# Patient Record
Sex: FEMALE | Race: WHITE | NOT HISPANIC OR LATINO | Employment: OTHER | URBAN - NONMETROPOLITAN AREA
[De-identification: names, ages, dates, MRNs, and addresses within clinical notes are randomized per-mention and may not be internally consistent; named-entity substitution may affect disease eponyms.]

---

## 2017-05-23 DIAGNOSIS — H81.10 BPPV (BENIGN PAROXYSMAL POSITIONAL VERTIGO), UNSPECIFIED LATERALITY: ICD-10-CM

## 2017-05-23 RX ORDER — MECLIZINE HYDROCHLORIDE 25 MG/1
TABLET ORAL
Qty: 15 TABLET | Refills: 0 | Status: SHIPPED | OUTPATIENT
Start: 2017-05-23 | End: 2018-05-02 | Stop reason: ALTCHOICE

## 2017-06-01 ENCOUNTER — APPOINTMENT (OUTPATIENT)
Dept: LAB | Facility: HOSPITAL | Age: 72
End: 2017-06-01

## 2017-06-01 ENCOUNTER — OFFICE VISIT (OUTPATIENT)
Dept: FAMILY MEDICINE CLINIC | Facility: CLINIC | Age: 72
End: 2017-06-01

## 2017-06-01 VITALS
WEIGHT: 166 LBS | SYSTOLIC BLOOD PRESSURE: 120 MMHG | BODY MASS INDEX: 27.66 KG/M2 | DIASTOLIC BLOOD PRESSURE: 80 MMHG | HEIGHT: 65 IN

## 2017-06-01 DIAGNOSIS — H69.83 EUSTACHIAN TUBE DYSFUNCTION, BILATERAL: ICD-10-CM

## 2017-06-01 DIAGNOSIS — Z23 NEED FOR VACCINATION: Primary | ICD-10-CM

## 2017-06-01 DIAGNOSIS — Z12.31 VISIT FOR SCREENING MAMMOGRAM: ICD-10-CM

## 2017-06-01 DIAGNOSIS — J30.9 ALLERGIC RHINITIS, UNSPECIFIED ALLERGIC RHINITIS TRIGGER, UNSPECIFIED RHINITIS SEASONALITY: Chronic | ICD-10-CM

## 2017-06-01 DIAGNOSIS — E78.5 HYPERLIPIDEMIA, UNSPECIFIED HYPERLIPIDEMIA TYPE: Chronic | ICD-10-CM

## 2017-06-01 DIAGNOSIS — I10 ESSENTIAL HYPERTENSION: Chronic | ICD-10-CM

## 2017-06-01 LAB
ALBUMIN SERPL-MCNC: 4 G/DL (ref 3.4–4.8)
ALBUMIN/GLOB SERPL: 1.2 G/DL (ref 1.1–1.8)
ALP SERPL-CCNC: 113 U/L (ref 38–126)
ALT SERPL W P-5'-P-CCNC: 24 U/L (ref 9–52)
ANION GAP SERPL CALCULATED.3IONS-SCNC: 7 MMOL/L (ref 5–15)
ARTICHOKE IGE QN: 112 MG/DL (ref 1–129)
AST SERPL-CCNC: 37 U/L (ref 14–36)
BILIRUB SERPL-MCNC: 0.4 MG/DL (ref 0.2–1.3)
BUN BLD-MCNC: 12 MG/DL (ref 7–21)
BUN/CREAT SERPL: 11.4 (ref 7–25)
CALCIUM SPEC-SCNC: 8.9 MG/DL (ref 8.4–10.2)
CHLORIDE SERPL-SCNC: 99 MMOL/L (ref 95–110)
CO2 SERPL-SCNC: 29 MMOL/L (ref 22–31)
CREAT BLD-MCNC: 1.05 MG/DL (ref 0.5–1)
GFR SERPL CREATININE-BSD FRML MDRD: 52 ML/MIN/1.73 (ref 39–90)
GLOBULIN UR ELPH-MCNC: 3.3 GM/DL (ref 2.3–3.5)
GLUCOSE BLD-MCNC: 91 MG/DL (ref 60–100)
MAGNESIUM SERPL-MCNC: 2.4 MG/DL (ref 1.6–2.3)
POTASSIUM BLD-SCNC: 4.7 MMOL/L (ref 3.5–5.1)
PROT SERPL-MCNC: 7.3 G/DL (ref 6.3–8.6)
SODIUM BLD-SCNC: 135 MMOL/L (ref 137–145)

## 2017-06-01 PROCEDURE — 83721 ASSAY OF BLOOD LIPOPROTEIN: CPT | Performed by: FAMILY MEDICINE

## 2017-06-01 PROCEDURE — 36415 COLL VENOUS BLD VENIPUNCTURE: CPT | Performed by: FAMILY MEDICINE

## 2017-06-01 PROCEDURE — 99214 OFFICE O/P EST MOD 30 MIN: CPT | Performed by: FAMILY MEDICINE

## 2017-06-01 PROCEDURE — 83735 ASSAY OF MAGNESIUM: CPT | Performed by: FAMILY MEDICINE

## 2017-06-01 PROCEDURE — 90670 PCV13 VACCINE IM: CPT | Performed by: FAMILY MEDICINE

## 2017-06-01 PROCEDURE — G0009 ADMIN PNEUMOCOCCAL VACCINE: HCPCS | Performed by: FAMILY MEDICINE

## 2017-06-01 PROCEDURE — 80053 COMPREHEN METABOLIC PANEL: CPT | Performed by: FAMILY MEDICINE

## 2017-06-01 RX ORDER — OXYBUTYNIN CHLORIDE 10 MG/1
10 TABLET, EXTENDED RELEASE ORAL NIGHTLY
COMMUNITY
Start: 2017-05-30 | End: 2018-12-06

## 2017-06-01 RX ORDER — FLUTICASONE PROPIONATE 50 MCG
2 SPRAY, SUSPENSION (ML) NASAL DAILY
Qty: 1 EACH | Refills: 11 | Status: SHIPPED | OUTPATIENT
Start: 2017-06-01 | End: 2017-07-01

## 2017-06-01 RX ORDER — HYOSCYAMINE SULFATE 0.125 MG
125 TABLET,DISINTEGRATING ORAL EVERY 4 HOURS PRN
COMMUNITY
End: 2018-10-07

## 2017-06-01 NOTE — PROGRESS NOTES
Subjective   Olivia Araujo is a 71 y.o. female.     History of Present Illness requesting primary care evaluation.  Carries diagnoses of hypertension hyperlipidemia however is not taking any medicine for same.  Is complaining of bilateral ear pain off and on pressure sensations.  Also needs a mammogram scheduled.  Gets colonoscopies per Dr. Laguna.  Has had one pneumonia vaccine but not both.  Gets flu vaccines in the fall.  History noted.    The following portions of the patient's history were reviewed and updated as appropriate: allergies, current medications, past family history, past medical history, past social history, past surgical history and problem list.    Review of Systems   Constitutional: Negative for activity change, appetite change, fatigue and unexpected weight change.   HENT: Positive for ear pain. Negative for trouble swallowing and voice change.    Eyes: Negative for redness and visual disturbance.   Respiratory: Negative for cough and wheezing.    Cardiovascular: Negative for chest pain and palpitations.   Gastrointestinal: Negative for abdominal pain, constipation, diarrhea, nausea and vomiting.   Genitourinary: Negative for urgency.   Musculoskeletal: Negative for joint swelling.   Neurological: Negative for syncope and headaches.   Hematological: Negative for adenopathy.   Psychiatric/Behavioral: Negative for sleep disturbance.       Objective   Physical Exam   Constitutional: She is oriented to person, place, and time. She appears well-developed.   HENT:   Head: Normocephalic.   Right Ear: External ear normal. Tympanic membrane is retracted.   Left Ear: Tympanic membrane is retracted.   Nose: Nose normal.   Mouth/Throat: Oropharynx is clear and moist.   Eyes: Pupils are equal, round, and reactive to light.   Neck: Normal range of motion. No thyromegaly present.   Cardiovascular: Normal rate, regular rhythm, normal heart sounds and intact distal pulses.  Exam reveals no gallop and no  friction rub.    No murmur heard.  Pulmonary/Chest: Breath sounds normal.   Abdominal: Soft. She exhibits no distension and no mass. There is no tenderness.   Musculoskeletal: Normal range of motion.   Neurological: She is alert and oriented to person, place, and time. She has normal reflexes.   Skin: Skin is warm and dry.   Psychiatric: She has a normal mood and affect.       Assessment/Plan   Problems Addressed this Visit        Cardiovascular and Mediastinum    Hyperlipidemia (Chronic)    Relevant Orders    LDL cholesterol, direct    Essential hypertension (Chronic)    Relevant Orders    Comprehensive Metabolic Panel    Magnesium       Respiratory    Allergic rhinitis (Chronic)      Other Visit Diagnoses     Need for vaccination    -  Primary    Relevant Orders    Pneumococcal Conjugate Vaccine 13-Valent All (Completed)    Eustachian tube dysfunction, bilateral        Relevant Medications    fluticasone (FLONASE) 50 MCG/ACT nasal spray    Visit for screening mammogram        Relevant Orders    Mammo Screening Digital Tomosynthesis Bilateral With CAD         on summer time hydration counseled on lifestyle measures fall prevention etc.  Screenings and labs ordered.  Counseled on same.  Recheck 6 months.

## 2017-07-10 ENCOUNTER — APPOINTMENT (OUTPATIENT)
Dept: MAMMOGRAPHY | Facility: CLINIC | Age: 72
End: 2017-07-10

## 2017-07-10 DIAGNOSIS — Z12.31 VISIT FOR SCREENING MAMMOGRAM: ICD-10-CM

## 2017-07-10 PROCEDURE — 77063 BREAST TOMOSYNTHESIS BI: CPT | Performed by: FAMILY MEDICINE

## 2017-07-10 PROCEDURE — G0202 SCR MAMMO BI INCL CAD: HCPCS | Performed by: FAMILY MEDICINE

## 2017-07-13 RX ORDER — CYCLOSPORINE 0.5 MG/ML
EMULSION OPHTHALMIC
Qty: 1 EACH | Refills: 5 | Status: SHIPPED | OUTPATIENT
Start: 2017-07-13 | End: 2018-05-02 | Stop reason: ALTCHOICE

## 2017-10-31 ENCOUNTER — OFFICE VISIT (OUTPATIENT)
Dept: FAMILY MEDICINE CLINIC | Facility: CLINIC | Age: 72
End: 2017-10-31

## 2017-10-31 VITALS
SYSTOLIC BLOOD PRESSURE: 122 MMHG | HEIGHT: 65 IN | DIASTOLIC BLOOD PRESSURE: 78 MMHG | BODY MASS INDEX: 26.73 KG/M2 | WEIGHT: 160.4 LBS

## 2017-10-31 DIAGNOSIS — S21.201A WOUND OF RIGHT SIDE OF BACK, INITIAL ENCOUNTER: Primary | ICD-10-CM

## 2017-10-31 DIAGNOSIS — R21 RASH: ICD-10-CM

## 2017-10-31 PROCEDURE — 90471 IMMUNIZATION ADMIN: CPT | Performed by: FAMILY MEDICINE

## 2017-10-31 PROCEDURE — 99213 OFFICE O/P EST LOW 20 MIN: CPT | Performed by: FAMILY MEDICINE

## 2017-10-31 PROCEDURE — 90715 TDAP VACCINE 7 YRS/> IM: CPT | Performed by: FAMILY MEDICINE

## 2017-10-31 RX ORDER — CLOBETASOL PROPIONATE 0.5 MG/G
CREAM TOPICAL 2 TIMES DAILY
Qty: 30 G | Refills: 1 | Status: SHIPPED | OUTPATIENT
Start: 2017-10-31 | End: 2018-05-02 | Stop reason: ALTCHOICE

## 2017-10-31 NOTE — PROGRESS NOTES
Subjective   Olivia Araujo is a 72 y.o. female.     History of Present Illness  requesting evaluation wound and rash right mid axillary line posterior axillary line for about the past 2-3 weeks.  Patient states that she thought it was a tick may have had it partially removed.  Then been putting over-the-counter medicines on in the rashes continued.  Slightly pruritic.  No fevers no chills no drainage.    The following portions of the patient's history were reviewed and updated as appropriate: allergies, current medications, past family history, past medical history, past social history, past surgical history and problem list.    Review of Systems   Constitutional: Negative for activity change, appetite change, fatigue and unexpected weight change.   HENT: Negative for trouble swallowing and voice change.    Eyes: Negative for redness and visual disturbance.   Respiratory: Negative for cough and wheezing.    Cardiovascular: Negative for chest pain and palpitations.   Gastrointestinal: Negative for abdominal pain, constipation, diarrhea, nausea and vomiting.   Genitourinary: Negative for urgency.   Musculoskeletal: Negative for joint swelling.   Skin: Positive for rash and wound.   Neurological: Negative for syncope and headaches.   Hematological: Negative for adenopathy.   Psychiatric/Behavioral: Negative for sleep disturbance.       Objective   Physical Exam   Constitutional: She appears well-developed.   HENT:   Head: Normocephalic.   Eyes: Pupils are equal, round, and reactive to light.   Neck: Normal range of motion.   Cardiovascular: Normal rate and regular rhythm.    Abdominal: Soft.   Skin:        Above-mentioned area shows a punctate wound with small eschar about 4 mm with a small area of localized reaction about 3 mm round flat around that shows small raised red areas consistent with neomycin rash about 3 cm.   Psychiatric: She has a normal mood and affect. Her speech is normal.   Mild hard of hearing        Assessment/Plan   Olivia was seen today for insect bite.    Diagnoses and all orders for this visit:    Wound of right side of back, initial encounter  -     Tdap Vaccine Greater Than or Equal To 6yo IM    Rash  -     clobetasol (TEMOVATE) 0.05 % cream; Apply  topically 2 (Two) Times a Day. X 10d to area          on no manipulation.  Stop over-the-counter medicines.  Routine bathing.  Short-term medication ordered.   disease process.  Update tetanus.  Recheck as directed.

## 2017-12-01 ENCOUNTER — OFFICE VISIT (OUTPATIENT)
Dept: FAMILY MEDICINE CLINIC | Facility: CLINIC | Age: 72
End: 2017-12-01

## 2017-12-01 VITALS
HEIGHT: 65 IN | WEIGHT: 170 LBS | SYSTOLIC BLOOD PRESSURE: 136 MMHG | DIASTOLIC BLOOD PRESSURE: 82 MMHG | BODY MASS INDEX: 28.32 KG/M2

## 2017-12-01 DIAGNOSIS — K21.9 GASTROESOPHAGEAL REFLUX DISEASE WITHOUT ESOPHAGITIS: Chronic | ICD-10-CM

## 2017-12-01 DIAGNOSIS — I10 ESSENTIAL HYPERTENSION: Primary | Chronic | ICD-10-CM

## 2017-12-01 DIAGNOSIS — L57.0 ACTINIC KERATOSIS: ICD-10-CM

## 2017-12-01 DIAGNOSIS — L81.4 LENTIGO: ICD-10-CM

## 2017-12-01 DIAGNOSIS — F41.1 GAD (GENERALIZED ANXIETY DISORDER): ICD-10-CM

## 2017-12-01 PROCEDURE — 99214 OFFICE O/P EST MOD 30 MIN: CPT | Performed by: FAMILY MEDICINE

## 2017-12-01 PROCEDURE — 17000 DESTRUCT PREMALG LESION: CPT | Performed by: FAMILY MEDICINE

## 2017-12-01 NOTE — PROGRESS NOTES
Subjective   Olivia Araujo is a 72 y.o. female.     History of Present Illness   reevaluation diet-controlled hypertension.  History of hyperlipidemia but normal cholesterol last time.  Also history of dry eye syndrome.  Is up-to-date on all immunizations.  Multiple skin and eye issues.  Enforces some element of HAL as well.  Does have an early actinic keratoses at the dorsum right hand base MIP joint index does need to be frozen.  Has several lentigo of the FACE COUNSELED ABOUT.  ALSO COMPLAINING OF DRY FOR BACK TO OPHTHALMOLOGY FOR SAME.  The following portions of the patient's history were reviewed and updated as appropriate: allergies, current medications, past family history, past medical history, past social history, past surgical history and problem list.    Review of Systems   Constitutional: Negative for activity change, appetite change, fatigue and unexpected weight change.   HENT: Negative for trouble swallowing and voice change.    Eyes: Negative for redness and visual disturbance.   Respiratory: Negative for cough and wheezing.    Cardiovascular: Negative for chest pain and palpitations.   Gastrointestinal: Negative for abdominal pain, constipation, diarrhea, nausea and vomiting.   Genitourinary: Negative for urgency.   Musculoskeletal: Negative for joint swelling.   Neurological: Negative for syncope and headaches.   Hematological: Negative for adenopathy.   Psychiatric/Behavioral: Negative for sleep disturbance.       Objective   Physical Exam   Constitutional: She is oriented to person, place, and time. She appears well-developed.   HENT:   Head: Normocephalic.   Nose: Nose normal.   Eyes: Pupils are equal, round, and reactive to light.   Neck: Normal range of motion. No thyromegaly present.   Cardiovascular: Normal rate, regular rhythm, normal heart sounds and intact distal pulses.  Exam reveals no gallop and no friction rub.    No murmur heard.  Pulmonary/Chest: Breath sounds normal.    Abdominal: Soft. She exhibits no distension and no mass. There is no tenderness.   Musculoskeletal: Normal range of motion.   Neurological: She is alert and oriented to person, place, and time. She has normal reflexes.   Skin: Skin is warm and dry.   Above-mentioned skin changes as noticed.  No other new changes other than right hand.   Psychiatric: Her behavior is normal. Her mood appears anxious. Her speech is tangential.       Assessment/Plan   Problems Addressed this Visit        Cardiovascular and Mediastinum    Essential hypertension - Primary (Chronic)       Digestive    GERD (gastroesophageal reflux disease) (Chronic)       Other    HAL (generalized anxiety disorder)      Other Visit Diagnoses     Lentigo        Actinic keratosis            Further actinic keratosis frozen with liquid nitrogen ×2 after informed consent.  Counseled on skincare wound care no manipulation etc.  Counseled on skin care for the other counseled eye care.  Active listening.   on fall prevention infection prevention etc.  Recheck 6 months betime for lab.

## 2018-01-11 ENCOUNTER — OFFICE VISIT (OUTPATIENT)
Dept: FAMILY MEDICINE CLINIC | Facility: CLINIC | Age: 73
End: 2018-01-11

## 2018-01-11 VITALS
SYSTOLIC BLOOD PRESSURE: 120 MMHG | HEIGHT: 65 IN | BODY MASS INDEX: 26.67 KG/M2 | DIASTOLIC BLOOD PRESSURE: 76 MMHG | WEIGHT: 160.1 LBS

## 2018-01-11 DIAGNOSIS — F41.1 GAD (GENERALIZED ANXIETY DISORDER): ICD-10-CM

## 2018-01-11 DIAGNOSIS — H57.12 EYE PAIN, LEFT: ICD-10-CM

## 2018-01-11 DIAGNOSIS — H92.02 LEFT EAR PAIN: Primary | ICD-10-CM

## 2018-01-11 PROCEDURE — 99214 OFFICE O/P EST MOD 30 MIN: CPT | Performed by: FAMILY MEDICINE

## 2018-01-11 RX ORDER — CLOBETASOL PROPIONATE 0.46 MG/ML
SOLUTION TOPICAL
Qty: 25 ML | Refills: 1 | Status: SHIPPED | OUTPATIENT
Start: 2018-01-11 | End: 2020-02-12

## 2018-01-11 RX ORDER — FLUTICASONE PROPIONATE 50 MCG
SPRAY, SUSPENSION (ML) NASAL
COMMUNITY
Start: 2017-11-10 | End: 2018-05-07 | Stop reason: SDUPTHER

## 2018-01-11 RX ORDER — HYOSCYAMINE SULFATE 0.125 MG
TABLET ORAL
COMMUNITY
Start: 2017-11-14 | End: 2018-05-02 | Stop reason: SDUPTHER

## 2018-01-11 NOTE — PROGRESS NOTES
"Subjective   Olivia Araujo is a 72 y.o. female.     History of Present Illness   requesting evaluation of recurrent ear pain left.  States his had pain since childhood.  Unfortunately somewhat tangential and convoluted history.  States had ear pain off and on for sometimes.  Puts weight on it today and it made her left eye \"hurting respiratory for some time as well.  Have counseled on pathophysiology and anatomy of the head and neck.  Does see ophthalmology for above-mentioned R-wave problems and referred back to same for same.  Organic overlay.    The following portions of the patient's history were reviewed and updated as appropriate: allergies, current medications, past family history, past medical history, past social history, past surgical history and problem list.    Review of Systems   Constitutional: Negative for activity change, appetite change, fatigue and unexpected weight change.   HENT: Positive for ear pain. Negative for trouble swallowing and voice change.    Eyes: Positive for pain. Negative for redness and visual disturbance.   Respiratory: Negative for cough and wheezing.    Cardiovascular: Negative for chest pain and palpitations.   Gastrointestinal: Negative for abdominal pain, constipation, diarrhea, nausea and vomiting.   Genitourinary: Negative for urgency.   Musculoskeletal: Negative for joint swelling.   Neurological: Negative for syncope and headaches.   Hematological: Negative for adenopathy.   Psychiatric/Behavioral: Negative for sleep disturbance.       Objective   Physical Exam   Constitutional: She appears well-developed.   HENT:   Head: Normocephalic.   Right Ear: External ear normal.   Nose: Nose normal.   Mouth/Throat: Oropharynx is clear and moist.   Left canal shows minimal wax buildup unfortunately skin oiliness was difficult to get a good view the drum is retracted with no scar.  There may be some slight flaking of skin perhaps mild eczema but no pathology is seen.  TMJ " nontender.   Eyes: Conjunctivae and EOM are normal. Pupils are equal, round, and reactive to light.   Cardiovascular: Regular rhythm.    Pulmonary/Chest: Effort normal.   Psychiatric: Her behavior is normal. Her mood appears anxious. Her speech is tangential.       Assessment/Plan   Problems Addressed this Visit        Other    HAL (generalized anxiety disorder)      Other Visit Diagnoses     Left ear pain    -  Primary    Relevant Medications    clobetasol (TEMOVATE) 0.05 % external solution    carbamide peroxide (DEBROX) 6.5 % otic solution    Eye pain, left             her lack of findings.  Symptomatic relief above-mentioned medications.  Counseled no manipulation of the ear.  Active listening.  Deferred back to ophthalmology as directed.  Recheck as directed.

## 2018-05-02 RX ORDER — RABEPRAZOLE SODIUM 20 MG/1
20 TABLET, DELAYED RELEASE ORAL DAILY
COMMUNITY
End: 2019-08-26 | Stop reason: SDUPTHER

## 2018-05-02 RX ORDER — LANOLIN ALCOHOL/MO/W.PET/CERES
1000 CREAM (GRAM) TOPICAL DAILY
COMMUNITY
End: 2020-09-29

## 2018-05-02 RX ORDER — FAMOTIDINE 40 MG/1
40 TABLET, FILM COATED ORAL NIGHTLY PRN
COMMUNITY
End: 2018-06-08

## 2018-05-07 ENCOUNTER — OFFICE VISIT (OUTPATIENT)
Dept: FAMILY MEDICINE CLINIC | Facility: CLINIC | Age: 73
End: 2018-05-07

## 2018-05-07 VITALS
SYSTOLIC BLOOD PRESSURE: 138 MMHG | BODY MASS INDEX: 26.33 KG/M2 | HEIGHT: 65 IN | DIASTOLIC BLOOD PRESSURE: 80 MMHG | WEIGHT: 158 LBS

## 2018-05-07 DIAGNOSIS — F41.1 GAD (GENERALIZED ANXIETY DISORDER): Chronic | ICD-10-CM

## 2018-05-07 DIAGNOSIS — J30.9 ACUTE ALLERGIC RHINITIS, UNSPECIFIED SEASONALITY, UNSPECIFIED TRIGGER: Chronic | ICD-10-CM

## 2018-05-07 DIAGNOSIS — I10 ESSENTIAL HYPERTENSION: Primary | Chronic | ICD-10-CM

## 2018-05-07 PROCEDURE — 99214 OFFICE O/P EST MOD 30 MIN: CPT | Performed by: FAMILY MEDICINE

## 2018-05-07 RX ORDER — TRIAMTERENE AND HYDROCHLOROTHIAZIDE 37.5; 25 MG/1; MG/1
TABLET ORAL
Qty: 45 TABLET | Refills: 3 | Status: SHIPPED | OUTPATIENT
Start: 2018-05-07 | End: 2019-05-01 | Stop reason: SDUPTHER

## 2018-05-07 RX ORDER — FLUTICASONE PROPIONATE 50 MCG
2 SPRAY, SUSPENSION (ML) NASAL DAILY
Qty: 1 BOTTLE | Refills: 11 | Status: SHIPPED | OUTPATIENT
Start: 2018-05-07 | End: 2019-05-03 | Stop reason: SDUPTHER

## 2018-05-07 RX ORDER — HYDROCHLOROTHIAZIDE 12.5 MG/1
12.5 CAPSULE, GELATIN COATED ORAL DAILY
COMMUNITY
End: 2018-05-07

## 2018-05-07 NOTE — PROGRESS NOTES
Subjective   Olivia Araujo is a 72 y.o. female.     History of Present Illness   present evaluation hypertension.  Was taking blood pressure normal the past several weeks without murmurs been averaging about 150-160-145.  Bottom number below 80.  Pulse in the 70s.  His been on 100 with thiazide in the past.  Found all bottled started taking it.  Counseled stopping same.  Suspect does have an element of systolic hypertension mild.  Also requesting refill on Flonase.  Some amount of HAL as well.    The following portions of the patient's history were reviewed and updated as appropriate: allergies, current medications, past family history, past medical history, past social history, past surgical history and problem list.    Review of Systems   Constitutional: Negative for activity change, appetite change, fatigue and unexpected weight change.   HENT: Negative for trouble swallowing and voice change.    Eyes: Negative for redness and visual disturbance.   Respiratory: Negative for cough and wheezing.    Cardiovascular: Negative for chest pain and palpitations.   Gastrointestinal: Negative for abdominal pain, constipation, diarrhea, nausea and vomiting.   Genitourinary: Negative for urgency.   Musculoskeletal: Negative for joint swelling.   Neurological: Positive for dizziness. Negative for syncope and headaches.   Hematological: Negative for adenopathy.   Psychiatric/Behavioral: Negative for sleep disturbance.       Objective   Physical Exam   Constitutional: She is oriented to person, place, and time. She appears well-developed.   HENT:   Head: Normocephalic.   Nose: Nose normal.   Eyes: Pupils are equal, round, and reactive to light.   Neck: Normal range of motion. No thyromegaly present.   Cardiovascular: Normal rate, regular rhythm, normal heart sounds and intact distal pulses.  Exam reveals no gallop and no friction rub.    No murmur heard.  Pulmonary/Chest: Breath sounds normal.   Abdominal: Soft. She exhibits  no distension and no mass. There is no tenderness.   Musculoskeletal: Normal range of motion.   Neurological: She is alert and oriented to person, place, and time. She has normal reflexes.   Skin: Skin is warm and dry.   Psychiatric: Her speech is normal and behavior is normal. Her mood appears anxious. Cognition and memory are normal.       Assessment/Plan   Olivia was seen today for hypertension.    Diagnoses and all orders for this visit:    Essential hypertension  -     triamterene-hydrochlorothiazide (MAXZIDE-25) 37.5-25 MG per tablet; Half tab qd for bp    Acute allergic rhinitis, unspecified seasonality, unspecified trigger  -     fluticasone (FLONASE) 50 MCG/ACT nasal spray; 2 sprays into each nostril Daily.    HAL (generalized anxiety disorder)       cell restriction diet and exercises per routine.  We'll try to switch to Maxide half a tab a day.   using it every day.  Back on Flonase Taina pot etc.  Keep follow-up appointment in June.

## 2018-05-09 ENCOUNTER — ANESTHESIA EVENT (OUTPATIENT)
Dept: GASTROENTEROLOGY | Facility: HOSPITAL | Age: 73
End: 2018-05-09

## 2018-05-09 ENCOUNTER — HOSPITAL ENCOUNTER (OUTPATIENT)
Facility: HOSPITAL | Age: 73
Setting detail: HOSPITAL OUTPATIENT SURGERY
Discharge: HOME | End: 2018-05-09
Attending: INTERNAL MEDICINE | Admitting: INTERNAL MEDICINE

## 2018-05-09 ENCOUNTER — ANESTHESIA (OUTPATIENT)
Dept: GASTROENTEROLOGY | Facility: HOSPITAL | Age: 73
End: 2018-05-09

## 2018-05-09 VITALS
TEMPERATURE: 97.6 F | BODY MASS INDEX: 26.04 KG/M2 | SYSTOLIC BLOOD PRESSURE: 151 MMHG | DIASTOLIC BLOOD PRESSURE: 80 MMHG | WEIGHT: 156.31 LBS | OXYGEN SATURATION: 95 % | HEIGHT: 65 IN | HEART RATE: 78 BPM | RESPIRATION RATE: 20 BRPM

## 2018-05-09 DIAGNOSIS — K91.1 DUMPING SYNDROME: ICD-10-CM

## 2018-05-09 PROCEDURE — 88305 TISSUE EXAM BY PATHOLOGIST: CPT | Performed by: PATHOLOGY

## 2018-05-09 PROCEDURE — 88342 IMHCHEM/IMCYTCHM 1ST ANTB: CPT | Performed by: PATHOLOGY

## 2018-05-09 PROCEDURE — 25010000002 PROPOFOL 10 MG/ML EMULSION: Performed by: NURSE ANESTHETIST, CERTIFIED REGISTERED

## 2018-05-09 PROCEDURE — 88342 IMHCHEM/IMCYTCHM 1ST ANTB: CPT | Performed by: INTERNAL MEDICINE

## 2018-05-09 PROCEDURE — 88305 TISSUE EXAM BY PATHOLOGIST: CPT | Performed by: INTERNAL MEDICINE

## 2018-05-09 PROCEDURE — 87071 CULTURE AEROBIC QUANT OTHER: CPT | Performed by: INTERNAL MEDICINE

## 2018-05-09 RX ORDER — PROMETHAZINE HYDROCHLORIDE 25 MG/1
25 TABLET ORAL ONCE AS NEEDED
Status: DISCONTINUED | OUTPATIENT
Start: 2018-05-09 | End: 2018-05-10 | Stop reason: HOSPADM

## 2018-05-09 RX ORDER — PROPOFOL 10 MG/ML
VIAL (ML) INTRAVENOUS AS NEEDED
Status: DISCONTINUED | OUTPATIENT
Start: 2018-05-09 | End: 2018-05-09 | Stop reason: SURG

## 2018-05-09 RX ORDER — PROMETHAZINE HYDROCHLORIDE 25 MG/ML
12.5 INJECTION, SOLUTION INTRAMUSCULAR; INTRAVENOUS ONCE AS NEEDED
Status: DISCONTINUED | OUTPATIENT
Start: 2018-05-09 | End: 2018-05-10 | Stop reason: HOSPADM

## 2018-05-09 RX ORDER — ONDANSETRON 2 MG/ML
4 INJECTION INTRAMUSCULAR; INTRAVENOUS ONCE AS NEEDED
Status: DISCONTINUED | OUTPATIENT
Start: 2018-05-09 | End: 2018-05-10 | Stop reason: HOSPADM

## 2018-05-09 RX ORDER — PROMETHAZINE HYDROCHLORIDE 25 MG/1
25 SUPPOSITORY RECTAL ONCE AS NEEDED
Status: DISCONTINUED | OUTPATIENT
Start: 2018-05-09 | End: 2018-05-10 | Stop reason: HOSPADM

## 2018-05-09 RX ORDER — DEXTROSE AND SODIUM CHLORIDE 5; .45 G/100ML; G/100ML
30 INJECTION, SOLUTION INTRAVENOUS CONTINUOUS
Status: DISCONTINUED | OUTPATIENT
Start: 2018-05-09 | End: 2018-05-10 | Stop reason: HOSPADM

## 2018-05-09 RX ORDER — LIDOCAINE HYDROCHLORIDE 10 MG/ML
INJECTION, SOLUTION INFILTRATION; PERINEURAL AS NEEDED
Status: DISCONTINUED | OUTPATIENT
Start: 2018-05-09 | End: 2018-05-09 | Stop reason: SURG

## 2018-05-09 RX ORDER — DEXAMETHASONE SODIUM PHOSPHATE 4 MG/ML
8 INJECTION, SOLUTION INTRA-ARTICULAR; INTRALESIONAL; INTRAMUSCULAR; INTRAVENOUS; SOFT TISSUE ONCE AS NEEDED
Status: DISCONTINUED | OUTPATIENT
Start: 2018-05-09 | End: 2018-05-10 | Stop reason: HOSPADM

## 2018-05-09 RX ADMIN — LIDOCAINE HYDROCHLORIDE 70 MG: 10 INJECTION, SOLUTION INFILTRATION; PERINEURAL at 17:05

## 2018-05-09 RX ADMIN — PROPOFOL 80 MG: 10 INJECTION, EMULSION INTRAVENOUS at 17:05

## 2018-05-09 RX ADMIN — PROPOFOL 30 MG: 10 INJECTION, EMULSION INTRAVENOUS at 17:08

## 2018-05-09 RX ADMIN — DEXTROSE AND SODIUM CHLORIDE 30 ML/HR: 5; 450 INJECTION, SOLUTION INTRAVENOUS at 16:15

## 2018-05-09 NOTE — H&P
Brooke Conner DO,[facility]  Gastroenterology  Hepatology  Endoscopy  Board Certified in Internal Medicine and gastroenterology  [address], [address]  [place]. [address]  - [phone]   F - [phone]     GASTROENTEROLOGY HISTORY AND PHYSICAL  NOTE   BROOKE CONNER DO.         SUBJECTIVE:   5/9/2018    Name: Olivia Araujo  DOD: 1945    Chief Complaint:       Subjective : Dyspepsia, history of gastric surgeries    Patient is 72 y.o. female presents with desire for elective EGD with biopsies and cultures.      ROS/HISTORY/ CURRENT MEDICATIONS/OBJECTIVE/VS/PE:   Review of Systems:   Review of Systems    History:     Past Medical History:   Diagnosis Date   • Acute suppurative otitis media without spontaneous rupture of ear drum     LEFT   • Allergic rhinitis    • Astigmatism    • Cataract    • Colitis    • Cortical senile cataract    • Dry eye    • Dysphagia     POST-OPERATIVE   • Encounter for breast cancer screening other than mammogram    • Essential hypertension     no bp meds now   • Fatigue    • Filamentary keratitis of left eye    • GERD (gastroesophageal reflux disease)    • Hiatal hernia    • History of mammography, diagnostic 10/02/2009    MAMMOGRAM BREAST DIAGNOSTIC [place] [address]21 (Probably benign findings-Recommend continued follow-up at the time of the patient's regular screening mammogram)   • History of mammography, diagnostic 03/31/2009    MAMMOGRAM BREAST DIAGNOSTIC LT 03499 (Probably benign punctate calcifications identified .Short interval follow-up suggested.Follow-up in 6 mos)   • Hyperlipidemia    • Hyponatremia    • Irritable bowel syndrome    • Keratoconjunctivitis sicca    • Keratoconjunctivitis sicca    • Labyrinthitis     CHRONIC   • Myopia    • Posterior subcapsular polar senile cataract    • Stricture of esophagus    • Vitamin D deficiency      Past Surgical History:   Procedure Laterality Date   • CERVICAL CONIZATION  04/27/1966    chronic cervictitis,unresponding  to cautery and conservative treatment   • COLONOSCOPY  02/16/1998    Colon endoscopy (Internal hemorrhoids.Ischemic colitis has now healed.No other colonic polyps are identified)   • DILATATION AND CURETTAGE  08/28/1978    D&C (punch biopsy of the cervix and electrocautery of the cervix)   • MAMMO BILATERAL  07/01/2016    SCREENING MAMMOGRAPHY DIGITAL  (Medicare) (Encounter for screening mammogram for malignant neoplasm of breast)    • OTHER SURGICAL HISTORY  02/28/2008    Hysteroscope procedure (Diagnostic hysteroscopy with fractional dilation and curettage and polypectomy.Postmenopausal bleeding)   • OTHER SURGICAL HISTORY  12/30/1997    Hysteroscope procedure (Hysteroscopic polypectomy, fractional D&C.Fluid in endometrium and polypoid tissue per ultrasound)   • PAP SMEAR  03/24/2009    NORMAL   • THORACOTOMY  05/14/2008    Left,repair of diaphragmatic hernia,Jerri gastroplasty,placement of Marcaine infusion system(remainder of the procedure is detailed in 's operative note) large Hiatal hernia GERD,shortened esophagus ( greater than 5cm)   • TOOTH EXTRACTION  04/27/1966    Dental procedure (Removes teeth.Carious and unerupted teeth)   • TUBAL ABDOMINAL LIGATION  05/27/1977    anxiety,marked depression,reaction to birth control     Family History   Problem Relation Age of Onset   • Diabetes Other    • Hypertension Other    • Heart attack Other      REMARKABLE FOR MI   • Breast cancer Mother    • Breast cancer Sister      Social History   Substance Use Topics   • Smoking status: Never Smoker   • Smokeless tobacco: Not on file   • Alcohol use No     Prescriptions Prior to Admission   Medication Sig Dispense Refill Last Dose   • albuterol (PROVENTIL HFA;VENTOLIN HFA) 108 (90 BASE) MCG/ACT inhaler Inhale 2 puffs every 4 (four) hours as needed for wheezing.   Taking   • aspirin 325 MG tablet Take 325 mg by mouth daily. 1 tablet every day with lunch Oral   Taking   • Cholecalciferol (VITAMIN D-3 PO) Take  2,000 mg by mouth daily.   Taking   • famotidine (PEPCID) 40 MG tablet Take 40 mg by mouth At Night As Needed for Heartburn.   Taking   • hyoscyamine sulfate (ANASPAZ) 0.125 MG tablet dispersible disintegrating tablet Take 125 mcg by mouth Every 4 (Four) Hours As Needed.   Taking   • Multiple Vitamin (MULTI-VITAMINS PO) Take  by mouth.   Taking   • oxybutynin XL (DITROPAN-XL) 10 MG 24 hr tablet Take 10 mg by mouth Every Night.   Taking   • RABEprazole (ACIPHEX) 20 MG EC tablet Take 20 mg by mouth Daily.   Taking   • sucralfate (CARAFATE) 1 G tablet Take 1 g by mouth Daily.   Taking   • vitamin B-12 (CYANOCOBALAMIN) 1000 MCG tablet Take 1,000 mcg by mouth Daily.   Taking   • clobetasol (TEMOVATE) 0.05 % external solution 1 -2 gtts left ear bid x 2wks then prn 25 mL 1 Taking   • fluticasone (FLONASE) 50 MCG/ACT nasal spray 2 sprays into each nostril Daily. 1 bottle 11    • triamterene-hydrochlorothiazide (MAXZIDE-25) 37.5-25 MG per tablet Half tab qd for bp 45 tablet 3      Allergies:  Hydrocodone-guaifenesin; Other; Clarithromycin; Erythromycin; Neomycin; and Sulfa antibiotics    I have reviewed the patients medical history, surgical history and family history in the available medical record system.     Current Medications:     No current facility-administered medications for this encounter.        Objective     Physical Exam:        Physical Exam:  General Appearance:    Alert, cooperative, in no acute distress   Head:    Normocephalic, without obvious abnormality, atraumatic   Eyes:            Lids and lashes normal, conjunctivae and sclerae normal, no   icterus, no pallor, corneas clear, PERRLA   Ears:    Ears appear intact with no abnormalities noted   Throat:   No oral lesions, no thrush, oral mucosa moist   Neck:   No adenopathy, supple, trachea midline, no thyromegaly, no     carotid bruit, no JVD   Back:     No kyphosis present, no scoliosis present, no skin lesions,       erythema or scars, no tenderness to  percussion or                   palpation,   range of motion normal   Lungs:     Clear to auscultation,respirations regular, even and                   unlabored    Heart:    Regular rhythm and normal rate, normal S1 and S2, no            murmur, no gallop, no rub, no click   Breast Exam:    Deferred   Abdomen:     Normal bowel sounds, no masses, no organomegaly, soft        non-tender, non-distended, no guarding, no rebound                 tenderness   Genitalia:    Deferred   Extremities:   Moves all extremities well, no edema, no cyanosis, no              redness   Pulses:   Pulses palpable and equal bilaterally   Skin:   No bleeding, bruising or rash   Lymph nodes:   No palpable adenopathy   Neurologic:   Cranial nerves 2 - 12 grossly intact, sensation intact, DTR        present and equal bilaterally      Results Review:     Lab Results   Component Value Date    WBC 6.6 06/28/2016    WBC 7.2 09/23/2015    WBC 6.0 08/11/2014    HGB 12.4 06/28/2016    HGB 12.4 09/23/2015    HGB 12.2 08/11/2014    HCT 36.5 06/28/2016    HCT 35.4 09/23/2015    HCT 35.6 08/11/2014     06/28/2016     09/23/2015     08/11/2014             No results found for: LIPASE  No results found for: INR       Radiology Review:  Imaging Results (last 72 hours)     ** No results found for the last 72 hours. **           I reviewed the patient's new clinical results.  I reviewed the patient's new imaging results and agree with the interpretation.     ASSESSMENT/PLAN:   ASSESSMENT:   1.  Dyspepsia  2.  Status post gastric surgery    PLAN:   1.  Esophagogastroduodenoscopy with biopsies and cultures    Risk and benefits associated with the procedure are reviewed with the patient.  The patient wishes to proceed      Ollie Laguna DO  05/09/18  3:46 PM

## 2018-05-09 NOTE — ANESTHESIA PREPROCEDURE EVALUATION
Anesthesia Evaluation     NPO Solid Status: > 8 hours  NPO Liquid Status: > 6 hours           Airway   Mallampati: II  TM distance: >3 FB  Neck ROM: full  No difficulty expected  Dental    (+) partials and upper dentures    Pulmonary - normal exam   Cardiovascular - normal exam        Neuro/Psych  GI/Hepatic/Renal/Endo      Musculoskeletal     Abdominal  - normal exam   Substance History      OB/GYN          Other                        Anesthesia Plan    ASA 3     MAC     intravenous induction   Anesthetic plan and risks discussed with patient.

## 2018-05-09 NOTE — DISCHARGE INSTRUCTIONS
Ollie Laguna  [address]  [place] [address]  #[phone]    ++++Call tomorrow for a follow-up appointment+++        Outpatient Instructions for Monitored Anesthesia Care (MAC)    1. You will be released from the hospital in the care of a responsible adult who should remain with you for at least 6 hours.    2. You are at an increased risk for falls following anesthesia. Use care when changing from a lying to a sitting position. Use your assistive devices ( example: cane, walker or family member).    3. You must NOT drive a car, climb high places such as a ladder, or operate equipment such as electric knives,stoves etc...for at least 12 hours. If you are dizzy for longer than 24 hours, notify your doctor.    4. DO NOT drink any alcoholic beverages for at least 24 hours. Anesthesia may impair your judgement.    5. If you smoke, do not smoke alone due to increased risk of burns/fires.    6. DO NOT undertake any legally binding commitment for at least 24 hours. Anesthesia may impair your judgement.

## 2018-05-09 NOTE — ANESTHESIA POSTPROCEDURE EVALUATION
Patient: Olivia Araujo    Procedure Summary     Date:  05/09/18 Room / Location:  [facility] ENDOSCOPY 2 / [facility] ENDOSCOPY    Anesthesia Start:  1659 Anesthesia Stop:  1713    Procedure:  ESOPHAGOGASTRODUODENOSCOPY (N/A Esophagus) Diagnosis:       Dumping syndrome      (Dumping syndrome [K91.1])    Surgeon:  Ollie Laguna DO Provider:  Rea Knapp CRNA    Anesthesia Type:  MAC ASA Status:  3          Anesthesia Type: MAC  Last vitals  BP   126/79 (05/09/18 1552)   Temp   97.6 °F (36.4 °C) (05/09/18 1552)   Pulse   84 (05/09/18 1552)   Resp   18 (05/09/18 1552)     SpO2   98 % (05/09/18 1552)     Post Anesthesia Care and Evaluation    Patient location during evaluation: bedside  Patient participation: complete - patient participated  Level of consciousness: responsive to verbal stimuli  Pain management: adequate  Airway patency: patent  Anesthetic complications: No anesthetic complications    Cardiovascular status: acceptable  Respiratory status: acceptable  Hydration status: acceptable

## 2018-05-11 LAB
BACTERIA SPEC AEROBE CULT: ABNORMAL
LAB AP CASE REPORT: NORMAL
LAB AP SPECIAL STAINS: NORMAL
Lab: NORMAL
PATH REPORT.FINAL DX SPEC: NORMAL
PATH REPORT.GROSS SPEC: NORMAL

## 2018-06-08 ENCOUNTER — APPOINTMENT (OUTPATIENT)
Dept: LAB | Facility: HOSPITAL | Age: 73
End: 2018-06-08

## 2018-06-08 ENCOUNTER — OFFICE VISIT (OUTPATIENT)
Dept: FAMILY MEDICINE CLINIC | Facility: CLINIC | Age: 73
End: 2018-06-08

## 2018-06-08 VITALS
WEIGHT: 154.1 LBS | BODY MASS INDEX: 25.67 KG/M2 | DIASTOLIC BLOOD PRESSURE: 70 MMHG | HEIGHT: 65 IN | SYSTOLIC BLOOD PRESSURE: 128 MMHG

## 2018-06-08 DIAGNOSIS — Z23 NEED FOR VACCINATION: ICD-10-CM

## 2018-06-08 DIAGNOSIS — Z12.31 VISIT FOR SCREENING MAMMOGRAM: ICD-10-CM

## 2018-06-08 DIAGNOSIS — L81.4 LENTIGO: ICD-10-CM

## 2018-06-08 DIAGNOSIS — F41.1 GAD (GENERALIZED ANXIETY DISORDER): Chronic | ICD-10-CM

## 2018-06-08 DIAGNOSIS — I10 ESSENTIAL HYPERTENSION: Primary | Chronic | ICD-10-CM

## 2018-06-08 LAB
ALBUMIN SERPL-MCNC: 4.4 G/DL (ref 3.4–4.8)
ALBUMIN/GLOB SERPL: 1.3 G/DL (ref 1.1–1.8)
ALP SERPL-CCNC: 86 U/L (ref 38–126)
ALT SERPL W P-5'-P-CCNC: 23 U/L (ref 9–52)
ANION GAP SERPL CALCULATED.3IONS-SCNC: 10 MMOL/L (ref 5–15)
AST SERPL-CCNC: 30 U/L (ref 14–36)
BILIRUB SERPL-MCNC: 0.5 MG/DL (ref 0.2–1.3)
BUN BLD-MCNC: 9 MG/DL (ref 7–21)
BUN/CREAT SERPL: 10.3 (ref 7–25)
CALCIUM SPEC-SCNC: 9.2 MG/DL (ref 8.4–10.2)
CHLORIDE SERPL-SCNC: 90 MMOL/L (ref 95–110)
CO2 SERPL-SCNC: 30 MMOL/L (ref 22–31)
CREAT BLD-MCNC: 0.87 MG/DL (ref 0.5–1)
GFR SERPL CREATININE-BSD FRML MDRD: 64 ML/MIN/1.73 (ref 39–90)
GLOBULIN UR ELPH-MCNC: 3.3 GM/DL (ref 2.3–3.5)
GLUCOSE BLD-MCNC: 85 MG/DL (ref 60–100)
MAGNESIUM SERPL-MCNC: 2.3 MG/DL (ref 1.6–2.3)
POTASSIUM BLD-SCNC: 4.5 MMOL/L (ref 3.5–5.1)
PROT SERPL-MCNC: 7.7 G/DL (ref 6.3–8.6)
SODIUM BLD-SCNC: 130 MMOL/L (ref 137–145)

## 2018-06-08 PROCEDURE — 83735 ASSAY OF MAGNESIUM: CPT | Performed by: FAMILY MEDICINE

## 2018-06-08 PROCEDURE — 90732 PPSV23 VACC 2 YRS+ SUBQ/IM: CPT | Performed by: FAMILY MEDICINE

## 2018-06-08 PROCEDURE — 36415 COLL VENOUS BLD VENIPUNCTURE: CPT | Performed by: FAMILY MEDICINE

## 2018-06-08 PROCEDURE — G0009 ADMIN PNEUMOCOCCAL VACCINE: HCPCS | Performed by: FAMILY MEDICINE

## 2018-06-08 PROCEDURE — 99214 OFFICE O/P EST MOD 30 MIN: CPT | Performed by: FAMILY MEDICINE

## 2018-06-08 PROCEDURE — 80053 COMPREHEN METABOLIC PANEL: CPT | Performed by: FAMILY MEDICINE

## 2018-06-08 RX ORDER — ONDANSETRON 4 MG/1
4 TABLET, ORALLY DISINTEGRATING ORAL AS NEEDED
Refills: 5 | COMMUNITY
Start: 2018-06-01 | End: 2018-10-07

## 2018-06-08 RX ORDER — OXYCODONE HYDROCHLORIDE AND ACETAMINOPHEN 5; 325 MG/1; MG/1
1 TABLET ORAL EVERY 6 HOURS PRN
Refills: 0 | COMMUNITY
Start: 2018-05-30 | End: 2018-06-08

## 2018-06-08 NOTE — PROGRESS NOTES
Subjective   Olivia Araujo is a 72 y.o. female.     History of Present Illness    follow-up new-onset hypertension generalized anxiety disorder history of chronic reflux.  In the interim said dental work done was unable to eat for walls accident lost some weight.  Blood pressure much improved on medication.  Is due for a mammogram and second final pneumonia vaccine.  Is seeing gastroenterology will defer to them for colonoscopy.  Overall feels much improved after interventions as mentioned above.    The following portions of the patient's history were reviewed and updated as appropriate: allergies, current medications, past family history, past medical history, past social history, past surgical history and problem list.    Review of Systems   Constitutional: Negative for activity change, appetite change, fatigue and unexpected weight change.   HENT: Negative for trouble swallowing and voice change.    Eyes: Negative for redness and visual disturbance.   Respiratory: Negative for cough and wheezing.    Cardiovascular: Negative for chest pain and palpitations.   Gastrointestinal: Negative for abdominal pain, constipation, diarrhea, nausea and vomiting.   Genitourinary: Negative for urgency.   Musculoskeletal: Negative for joint swelling.   Skin: Positive for color change (To go right lower arm counseled on same and sun damage history).   Neurological: Negative for syncope and headaches.   Hematological: Negative for adenopathy.   Psychiatric/Behavioral: Negative for sleep disturbance.       Objective   Physical Exam   Constitutional: She is oriented to person, place, and time. She appears well-developed.   HENT:   Head: Normocephalic.   Nose: Nose normal.   Eyes: Pupils are equal, round, and reactive to light.   Neck: Normal range of motion. No thyromegaly present.   Cardiovascular: Normal rate, regular rhythm, normal heart sounds and intact distal pulses.  Exam reveals no gallop and no friction rub.    No murmur  heard.  Pulmonary/Chest: Breath sounds normal.   Abdominal: Soft. She exhibits no distension and no mass. There is no tenderness.   Musculoskeletal: Normal range of motion.   Neurological: She is alert and oriented to person, place, and time. She has normal reflexes.   Skin: Skin is warm and dry.   List centimeter undergo right distal arm   Psychiatric: She has a normal mood and affect. Her behavior is normal. Judgment and thought content normal.       Assessment/Plan   Olivia was seen today for hypertension.    Diagnoses and all orders for this visit:    Essential hypertension  -     Comprehensive Metabolic Panel  -     Magnesium    Visit for screening mammogram  -     Mammo Screening Digital Tomosynthesis Bilateral With CAD    Need for vaccination  -     Pneumococcal Polysaccharide Vaccine 23-Valent Greater Than or Equal To 1yo Subcutaneous / IM    HAL (generalized anxiety disorder)    Lentigo         on sun exposure  summertime hydration exercise protein calorie nutrition medicines as above recheck 6 months

## 2018-07-10 ENCOUNTER — APPOINTMENT (OUTPATIENT)
Dept: LAB | Facility: HOSPITAL | Age: 73
End: 2018-07-10

## 2018-07-10 ENCOUNTER — OFFICE VISIT (OUTPATIENT)
Dept: FAMILY MEDICINE CLINIC | Facility: CLINIC | Age: 73
End: 2018-07-10

## 2018-07-10 VITALS
BODY MASS INDEX: 25.54 KG/M2 | HEIGHT: 65 IN | TEMPERATURE: 98.4 F | DIASTOLIC BLOOD PRESSURE: 80 MMHG | WEIGHT: 153.3 LBS | SYSTOLIC BLOOD PRESSURE: 120 MMHG

## 2018-07-10 DIAGNOSIS — N30.00 ACUTE CYSTITIS WITHOUT HEMATURIA: ICD-10-CM

## 2018-07-10 DIAGNOSIS — R30.0 DYSURIA: Primary | ICD-10-CM

## 2018-07-10 DIAGNOSIS — F41.1 GAD (GENERALIZED ANXIETY DISORDER): Chronic | ICD-10-CM

## 2018-07-10 LAB
LEUKOCYTE ESTERASE URINE, POC: ABNORMAL
RBC # UR STRIP: ABNORMAL /UL
RBC CAST, POC: ABNORMAL
WBC CAST, POC: ABNORMAL

## 2018-07-10 PROCEDURE — 99214 OFFICE O/P EST MOD 30 MIN: CPT | Performed by: FAMILY MEDICINE

## 2018-07-10 PROCEDURE — 87086 URINE CULTURE/COLONY COUNT: CPT | Performed by: FAMILY MEDICINE

## 2018-07-10 PROCEDURE — 81001 URINALYSIS AUTO W/SCOPE: CPT | Performed by: FAMILY MEDICINE

## 2018-07-10 PROCEDURE — 87077 CULTURE AEROBIC IDENTIFY: CPT | Performed by: FAMILY MEDICINE

## 2018-07-10 PROCEDURE — 87186 SC STD MICRODIL/AGAR DIL: CPT | Performed by: FAMILY MEDICINE

## 2018-07-10 RX ORDER — CIPROFLOXACIN 250 MG/1
TABLET, FILM COATED ORAL
Qty: 10 TABLET | Refills: 0 | Status: SHIPPED | OUTPATIENT
Start: 2018-07-10 | End: 2018-07-12

## 2018-07-10 RX ORDER — FLUOXETINE 10 MG/1
10 CAPSULE ORAL DAILY
Qty: 30 CAPSULE | Refills: 5 | Status: SHIPPED | OUTPATIENT
Start: 2018-07-10 | End: 2018-12-06 | Stop reason: SDUPTHER

## 2018-07-10 NOTE — PROGRESS NOTES
"Subjective   Olivia Araujo is a 72 y.o. female.     History of Present Illness    requesting evaluation of dysuria and hesitancy frequency past to 3 days.  Has history of recurrent UTIs none in a while.  History of what sounds like dystonic bladder.  Sees urology.  Also wishing to take \"\" as needed ' nerve pills for anxiety related to spousal illness.   on the inadvisability of using when necessary nerve pills but would be able use something like every day Prozac.  Counseled on pros and cons of medicine and limits of medicine.    The following portions of the patient's history were reviewed and updated as appropriate: allergies, current medications, past family history, past medical history, past social history, past surgical history and problem list.    Review of Systems   Constitutional: Negative for activity change, appetite change, fatigue and unexpected weight change.   HENT: Negative for trouble swallowing and voice change.    Eyes: Negative for redness and visual disturbance.   Respiratory: Negative for cough and wheezing.    Cardiovascular: Negative for chest pain and palpitations.   Gastrointestinal: Negative for abdominal pain, constipation, diarrhea, nausea and vomiting.   Genitourinary: Positive for difficulty urinating and dysuria. Negative for urgency.   Musculoskeletal: Negative for joint swelling.   Neurological: Negative for syncope and headaches.   Hematological: Negative for adenopathy.   Psychiatric/Behavioral: Negative for sleep disturbance. The patient is nervous/anxious.        Objective   Physical Exam   Constitutional: She appears well-developed.   HENT:   Head: Normocephalic.   Eyes: Pupils are equal, round, and reactive to light.   Neck: Normal range of motion.   Cardiovascular: Normal rate.    Pulmonary/Chest: Effort normal.   Abdominal: Soft.   Psychiatric: Her speech is normal. Her mood appears anxious. She is slowed.     Results for orders placed or performed in visit on " 07/10/18   POC Micro Urine   Result Value Ref Range    Leukocytes, UA 2+     Blood, UA 1+ (A) Negative    WBC Casts, UA 10-15     RBC Casts, UA 1-5          Assessment/Plan   Olivia was seen today for urinary tract infection.    Diagnoses and all orders for this visit:    Dysuria  -     POC Micro Urine    Acute cystitis without hematuria  -     Urine Culture - Urine, Urine, Clean Catch    HAL (generalized anxiety disorder)  -     ciprofloxacin (CIPRO) 250 MG tablet; 1 bid x 5d for urinary tract  -     FLUoxetine (PROZAC) 10 MG capsule; Take 1 capsule by mouth Daily. For anxiety      Short-term medication fluids preventative measures discussed  using Prozac every day as a trial for the next several weeks counseled on behavioral therapy if need be recheck as directed

## 2018-07-12 LAB — BACTERIA SPEC AEROBE CULT: ABNORMAL

## 2018-07-12 RX ORDER — NITROFURANTOIN MACROCRYSTALS 50 MG/1
CAPSULE ORAL
Qty: 21 CAPSULE | Refills: 0 | Status: SHIPPED | OUTPATIENT
Start: 2018-07-12 | End: 2018-08-23

## 2018-08-03 ENCOUNTER — TRANSCRIBE ORDERS (OUTPATIENT)
Dept: LAB | Facility: HOSPITAL | Age: 73
End: 2018-08-03

## 2018-08-03 ENCOUNTER — LAB (OUTPATIENT)
Dept: LAB | Facility: HOSPITAL | Age: 73
End: 2018-08-03

## 2018-08-03 DIAGNOSIS — N39.0 URINARY TRACT INFECTION WITHOUT HEMATURIA, SITE UNSPECIFIED: Primary | ICD-10-CM

## 2018-08-03 DIAGNOSIS — N39.0 URINARY TRACT INFECTION WITHOUT HEMATURIA, SITE UNSPECIFIED: ICD-10-CM

## 2018-08-03 LAB
BILIRUB UR QL STRIP: NEGATIVE
CLARITY UR: ABNORMAL
COLOR UR: YELLOW
GLUCOSE UR STRIP-MCNC: NEGATIVE MG/DL
HGB UR QL STRIP.AUTO: ABNORMAL
KETONES UR QL STRIP: NEGATIVE
LEUKOCYTE ESTERASE UR QL STRIP.AUTO: ABNORMAL
NITRITE UR QL STRIP: NEGATIVE
PH UR STRIP.AUTO: 7 [PH] (ref 5–9)
PROT UR QL STRIP: NEGATIVE
SP GR UR STRIP: 1.01 (ref 1–1.03)
UROBILINOGEN UR QL STRIP: ABNORMAL

## 2018-08-03 PROCEDURE — 87077 CULTURE AEROBIC IDENTIFY: CPT

## 2018-08-03 PROCEDURE — 81003 URINALYSIS AUTO W/O SCOPE: CPT

## 2018-08-03 PROCEDURE — 87086 URINE CULTURE/COLONY COUNT: CPT

## 2018-08-03 PROCEDURE — 87186 SC STD MICRODIL/AGAR DIL: CPT

## 2018-08-06 LAB — BACTERIA SPEC AEROBE CULT: ABNORMAL

## 2018-08-23 ENCOUNTER — APPOINTMENT (OUTPATIENT)
Dept: LAB | Facility: HOSPITAL | Age: 73
End: 2018-08-23

## 2018-08-23 ENCOUNTER — OFFICE VISIT (OUTPATIENT)
Dept: FAMILY MEDICINE CLINIC | Facility: CLINIC | Age: 73
End: 2018-08-23

## 2018-08-23 VITALS
WEIGHT: 148.2 LBS | BODY MASS INDEX: 24.69 KG/M2 | DIASTOLIC BLOOD PRESSURE: 72 MMHG | SYSTOLIC BLOOD PRESSURE: 122 MMHG | HEIGHT: 65 IN

## 2018-08-23 DIAGNOSIS — N39.0 RECURRENT UTI: Primary | ICD-10-CM

## 2018-08-23 PROCEDURE — 99213 OFFICE O/P EST LOW 20 MIN: CPT | Performed by: FAMILY MEDICINE

## 2018-08-23 PROCEDURE — 87086 URINE CULTURE/COLONY COUNT: CPT | Performed by: FAMILY MEDICINE

## 2018-08-23 NOTE — PROGRESS NOTES
Subjective   Olivia Araujo is a 72 y.o. female.     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  Once here today once GI.  Having no current symptoms now was told to have repeat culture done.  Has some history of bladder relaxation.    The following portions of the patient's history were reviewed and updated as appropriate: allergies, current medications, past family history, past medical history, past social history, past surgical history and problem list.    Review of Systems   Constitutional: Negative for activity change, appetite change, fatigue and unexpected weight change.   HENT: Negative for trouble swallowing and voice change.    Eyes: Negative for redness and visual disturbance.   Respiratory: Negative for cough and wheezing.    Cardiovascular: Negative for chest pain and palpitations.   Gastrointestinal: Negative for abdominal pain, constipation, diarrhea, nausea and vomiting.   Genitourinary: Negative for urgency.   Musculoskeletal: Negative for joint swelling.   Neurological: Negative for syncope and headaches.   Hematological: Negative for adenopathy.   Psychiatric/Behavioral: Negative for sleep disturbance.       Objective   Physical Exam   Constitutional: She appears well-developed.   HENT:   Head: Normocephalic.   Eyes: Pupils are equal, round, and reactive to light.   Neck: Normal range of motion.   Cardiovascular: Normal rate.    Pulmonary/Chest: Effort normal.   Abdominal: Soft.   Psychiatric: She has a normal mood and affect. Her behavior is normal. Thought content normal.       Assessment/Plan   Olivia was seen today for follow-up.    Diagnoses and all orders for this visit:    Recurrent UTI  -     Urine Culture - Urine, Urine, Clean Catch        on same we'll adjust treatment based on recurrent culture.   staying hydrated counseled on preventative techniques

## 2018-08-25 LAB — BACTERIA SPEC AEROBE CULT: NORMAL

## 2018-11-14 ENCOUNTER — OFFICE VISIT (OUTPATIENT)
Dept: FAMILY MEDICINE CLINIC | Facility: CLINIC | Age: 73
End: 2018-11-14

## 2018-11-14 VITALS
WEIGHT: 148 LBS | TEMPERATURE: 98 F | DIASTOLIC BLOOD PRESSURE: 80 MMHG | HEIGHT: 65 IN | SYSTOLIC BLOOD PRESSURE: 128 MMHG | BODY MASS INDEX: 24.66 KG/M2

## 2018-11-14 DIAGNOSIS — F41.1 GAD (GENERALIZED ANXIETY DISORDER): Chronic | ICD-10-CM

## 2018-11-14 DIAGNOSIS — R51.9 CHRONIC FACIAL PAIN: Primary | ICD-10-CM

## 2018-11-14 DIAGNOSIS — G89.29 CHRONIC FACIAL PAIN: Primary | ICD-10-CM

## 2018-11-14 DIAGNOSIS — H57.13 PAIN OF BOTH EYES: ICD-10-CM

## 2018-11-14 PROCEDURE — 99214 OFFICE O/P EST MOD 30 MIN: CPT | Performed by: FAMILY MEDICINE

## 2018-11-14 NOTE — PROGRESS NOTES
Subjective   Olivia Araujo is a 73 y.o. female.     History of Present Illness  requesting evaluation chronic face pain.  States had 2 year history of chronic eye pain right left comes and goes sensation of sinus pressure.  Minimal drainage.  Requesting antibiotics for chronic sinusitis.  Some amount of HAL as well.  History and medicine are noted.    The following portions of the patient's history were reviewed and updated as appropriate: allergies, current medications, past family history, past medical history, past social history, past surgical history and problem list.    Review of Systems   Constitutional: Negative for activity change, appetite change, fatigue and unexpected weight change.   HENT: Negative for trouble swallowing and voice change.    Eyes: Positive for pain. Negative for redness and visual disturbance.   Respiratory: Negative for cough and wheezing.    Cardiovascular: Negative for chest pain and palpitations.   Gastrointestinal: Negative for abdominal pain, constipation, diarrhea, nausea and vomiting.   Genitourinary: Negative for urgency.   Musculoskeletal: Negative for joint swelling.   Neurological: Positive for headaches. Negative for syncope.   Hematological: Negative for adenopathy.   Psychiatric/Behavioral: Negative for sleep disturbance. The patient is nervous/anxious.        Objective   Physical Exam   Constitutional: She appears well-developed.   HENT:   Head: Normocephalic.   Right Ear: External ear normal.   Left Ear: External ear normal.   Nose: Nose normal.   Mouth/Throat: Oropharynx is clear and moist.   TMJs nontender neck supple   Eyes: Conjunctivae, EOM and lids are normal. Pupils are equal, round, and reactive to light. Lids are everted and swept, no foreign bodies found. Right eye exhibits no chemosis. Left eye exhibits no chemosis.   Psychiatric: Her speech is normal and behavior is normal. Her mood appears anxious.       Assessment/Plan   Olivia was seen today for  sinusitis.    Diagnoses and all orders for this visit:    Chronic facial pain  -     CT Sinus Without Contrast; Future    Pain of both eyes  -     CT Sinus Without Contrast; Future  -     Ambulatory Referral for Diabetic Eye Exam-Ophthalmology    HAL (generalized anxiety disorder)      Counseling to evaluate sinus tract given history and timeframe.  Also  getting him with ophthalmology reevaluate eye pain.  Counseled not be using antibiotics until get a definitive diagnosis.  Arrangements made for the above follow-up based on results

## 2018-11-29 ENCOUNTER — HOSPITAL ENCOUNTER (OUTPATIENT)
Dept: CT IMAGING | Facility: HOSPITAL | Age: 73
Discharge: HOME | End: 2018-11-29
Admitting: FAMILY MEDICINE

## 2018-11-29 DIAGNOSIS — H57.13 PAIN OF BOTH EYES: ICD-10-CM

## 2018-11-29 DIAGNOSIS — R51.9 CHRONIC FACIAL PAIN: ICD-10-CM

## 2018-11-29 DIAGNOSIS — G89.29 CHRONIC FACIAL PAIN: ICD-10-CM

## 2018-11-29 PROCEDURE — 70486 CT MAXILLOFACIAL W/O DYE: CPT

## 2018-12-06 ENCOUNTER — OFFICE VISIT (OUTPATIENT)
Dept: FAMILY MEDICINE CLINIC | Facility: CLINIC | Age: 73
End: 2018-12-06

## 2018-12-06 VITALS
HEIGHT: 65 IN | BODY MASS INDEX: 24.89 KG/M2 | SYSTOLIC BLOOD PRESSURE: 120 MMHG | WEIGHT: 149.4 LBS | DIASTOLIC BLOOD PRESSURE: 82 MMHG

## 2018-12-06 DIAGNOSIS — I10 ESSENTIAL HYPERTENSION: Primary | Chronic | ICD-10-CM

## 2018-12-06 DIAGNOSIS — F41.1 GAD (GENERALIZED ANXIETY DISORDER): Chronic | ICD-10-CM

## 2018-12-06 DIAGNOSIS — H04.129 DRY EYE: ICD-10-CM

## 2018-12-06 PROCEDURE — 99214 OFFICE O/P EST MOD 30 MIN: CPT | Performed by: FAMILY MEDICINE

## 2018-12-06 RX ORDER — ALBUTEROL SULFATE 90 UG/1
2 AEROSOL, METERED RESPIRATORY (INHALATION) EVERY 4 HOURS PRN
Qty: 1 INHALER | Refills: 11 | Status: SHIPPED | OUTPATIENT
Start: 2018-12-06 | End: 2020-11-02

## 2018-12-06 RX ORDER — FLUOXETINE 10 MG/1
10 CAPSULE ORAL DAILY
Qty: 90 CAPSULE | Refills: 1 | Status: SHIPPED | OUTPATIENT
Start: 2018-12-06 | End: 2019-08-26 | Stop reason: SDUPTHER

## 2018-12-06 NOTE — PROGRESS NOTES
Subjective   Olivia Araujo is a 73 y.o. female.     History of Present Illness   reevaluation mild hypertension chronic anxiety chronic headaches related to dry eye.  In interim had a negative CT scan of sinus tract.  Continues on half a tablet for blood pressure as well as her Prozac.  States taking Prozac when necessary counseled taking it every day.  Been using some over-the-counter long-acting eyedrops for dry eyes and they seem to be helping.  Overall stable.  Has had a mammogram set a colonoscopy within the last 10 years.    The following portions of the patient's history were reviewed and updated as appropriate: allergies, current medications, past family history, past medical history, past social history, past surgical history and problem list.    Review of Systems   Constitutional: Negative for activity change, appetite change, fatigue and unexpected weight change.   HENT: Negative for trouble swallowing and voice change.    Eyes: Negative for redness and visual disturbance.   Respiratory: Negative for cough and wheezing.    Cardiovascular: Negative for chest pain and palpitations.   Gastrointestinal: Negative for abdominal pain, constipation, diarrhea, nausea and vomiting.   Genitourinary: Negative for urgency.   Musculoskeletal: Negative for joint swelling.   Neurological: Positive for headaches (Chronic). Negative for syncope.   Hematological: Negative for adenopathy.   Psychiatric/Behavioral: Negative for sleep disturbance.       Objective   Physical Exam   Constitutional: She is oriented to person, place, and time. She appears well-developed.   HENT:   Head: Normocephalic.   Nose: Nose normal.   Eyes: Pupils are equal, round, and reactive to light.   Neck: Normal range of motion. No thyromegaly present.   Cardiovascular: Normal rate, regular rhythm, normal heart sounds and intact distal pulses. Exam reveals no gallop and no friction rub.   No murmur heard.  Pulmonary/Chest: Breath sounds normal.    Abdominal: Soft. She exhibits no distension and no mass. There is no tenderness.   Musculoskeletal: Normal range of motion.   Neurological: She is alert and oriented to person, place, and time. She has normal reflexes.   Skin: Skin is warm and dry.   Psychiatric: She has a normal mood and affect. Her behavior is normal. Judgment and thought content normal.       Assessment/Plan   Olivia was seen today for hypertension.    Diagnoses and all orders for this visit:    Essential hypertension    HAL (generalized anxiety disorder)  -     FLUoxetine (PROZAC) 10 MG capsule; Take 1 capsule by mouth Daily. For anxiety    Dry eye    Other orders  -     albuterol 108 (90 Base) MCG/ACT inhaler; Inhale 2 puffs Every 4 (Four) Hours As Needed for Wheezing.    Normal motor time hydration counseled preferably use to help with the eyes of the symptomatic measures discussed continue medicines recheck 6 months betime for lab

## 2019-05-01 DIAGNOSIS — I10 ESSENTIAL HYPERTENSION: Chronic | ICD-10-CM

## 2019-05-01 RX ORDER — TRIAMTERENE AND HYDROCHLOROTHIAZIDE 37.5; 25 MG/1; MG/1
TABLET ORAL
Qty: 45 TABLET | Refills: 3 | Status: SHIPPED | OUTPATIENT
Start: 2019-05-01 | End: 2020-02-12

## 2019-05-03 ENCOUNTER — OFFICE VISIT (OUTPATIENT)
Dept: FAMILY MEDICINE CLINIC | Facility: CLINIC | Age: 74
End: 2019-05-03

## 2019-05-03 VITALS
DIASTOLIC BLOOD PRESSURE: 70 MMHG | HEIGHT: 65 IN | SYSTOLIC BLOOD PRESSURE: 132 MMHG | WEIGHT: 152.2 LBS | BODY MASS INDEX: 25.36 KG/M2 | TEMPERATURE: 98.7 F

## 2019-05-03 DIAGNOSIS — J06.9 ACUTE URI: Primary | ICD-10-CM

## 2019-05-03 DIAGNOSIS — J40 BRONCHITIS: ICD-10-CM

## 2019-05-03 DIAGNOSIS — J30.9 ACUTE ALLERGIC RHINITIS: ICD-10-CM

## 2019-05-03 DIAGNOSIS — J30.89 SEASONAL ALLERGIC RHINITIS DUE TO OTHER ALLERGIC TRIGGER: ICD-10-CM

## 2019-05-03 PROCEDURE — 99214 OFFICE O/P EST MOD 30 MIN: CPT | Performed by: FAMILY MEDICINE

## 2019-05-03 PROCEDURE — 96372 THER/PROPH/DIAG INJ SC/IM: CPT | Performed by: FAMILY MEDICINE

## 2019-05-03 RX ORDER — TRIAMCINOLONE ACETONIDE 40 MG/ML
40 INJECTION, SUSPENSION INTRA-ARTICULAR; INTRAMUSCULAR ONCE
Status: COMPLETED | OUTPATIENT
Start: 2019-05-03 | End: 2019-05-03

## 2019-05-03 RX ORDER — LEVOFLOXACIN 250 MG/1
250 TABLET ORAL DAILY
Qty: 7 TABLET | Refills: 0 | Status: SHIPPED | OUTPATIENT
Start: 2019-05-03 | End: 2019-05-16

## 2019-05-03 RX ORDER — FEXOFENADINE HCL 180 MG/1
180 TABLET ORAL NIGHTLY
COMMUNITY
End: 2022-04-14

## 2019-05-03 RX ORDER — FLUTICASONE PROPIONATE 50 MCG
2 SPRAY, SUSPENSION (ML) NASAL DAILY
Qty: 1 BOTTLE | Refills: 11 | Status: SHIPPED | OUTPATIENT
Start: 2019-05-03 | End: 2020-05-08

## 2019-05-03 RX ORDER — PREDNISONE 20 MG/1
TABLET ORAL
Qty: 10 TABLET | Refills: 0 | OUTPATIENT
Start: 2019-05-03 | End: 2019-05-16

## 2019-05-03 RX ADMIN — TRIAMCINOLONE ACETONIDE 40 MG: 40 INJECTION, SUSPENSION INTRA-ARTICULAR; INTRAMUSCULAR at 15:52

## 2019-05-03 NOTE — PROGRESS NOTES
Subjective   Olivia Araujo is a 73 y.o. female.     History of Present Illness    requesting evaluation 3 to 4 days history of cough congestion coryza sinus pressure drainage.  Some wheeze.  Exposed to same as well as environmental irritation.  Been using albuterol but not Flonase.  Some questionable low-grade fever at night.  History noted.    The following portions of the patient's history were reviewed and updated as appropriate: allergies, current medications, past family history, past medical history, past social history, past surgical history and problem list.    Review of Systems   Constitutional: Negative for activity change, appetite change, fatigue and unexpected weight change.   HENT: Positive for congestion, postnasal drip, rhinorrhea and sinus pressure. Negative for trouble swallowing and voice change.    Eyes: Negative for redness and visual disturbance.   Respiratory: Positive for cough and wheezing.    Cardiovascular: Negative for chest pain and palpitations.   Gastrointestinal: Negative for abdominal pain, constipation, diarrhea, nausea and vomiting.   Genitourinary: Negative for urgency.   Musculoskeletal: Negative for joint swelling.   Neurological: Negative for syncope and headaches.   Hematological: Negative for adenopathy.   Psychiatric/Behavioral: Negative for sleep disturbance.       Objective   Physical Exam   Constitutional: She is oriented to person, place, and time. She appears well-developed.   HENT:   Head: Normocephalic.   Right Ear: External ear normal.   Nose: Mucosal edema and rhinorrhea present.   Mouth/Throat: Posterior oropharyngeal erythema present.   Eyes: Pupils are equal, round, and reactive to light.   Neck: Normal range of motion. No thyromegaly present.   Cardiovascular: Normal rate, regular rhythm, normal heart sounds and intact distal pulses. Exam reveals no gallop and no friction rub.   No murmur heard.  Pulmonary/Chest: She has wheezes in the right lower field and  the left lower field. She has rhonchi in the right lower field and the left lower field.   Basilar rhonchi expiratory minimal wheeze.  Post nasal congestion noted   Abdominal: Soft. She exhibits no distension and no mass. There is no tenderness.   Musculoskeletal: Normal range of motion.   Neurological: She is alert and oriented to person, place, and time. She has normal reflexes.   Skin: Skin is warm and dry.   Psychiatric: She has a normal mood and affect.   No distress       Assessment/Plan   Olivia was seen today for cough, nasal congestion and earache.    Diagnoses and all orders for this visit:    Acute URI  -     triamcinolone acetonide (KENALOG-40) injection 40 mg  -     predniSONE (DELTASONE) 20 MG tablet; 2qd x 5 for sinus and resp tract  -     levoFLOXacin (LEVAQUIN) 250 MG tablet; Take 1 tablet by mouth Daily. Till gone for lungs    Bronchitis  -     triamcinolone acetonide (KENALOG-40) injection 40 mg  -     predniSONE (DELTASONE) 20 MG tablet; 2qd x 5 for sinus and resp tract  -     levoFLOXacin (LEVAQUIN) 250 MG tablet; Take 1 tablet by mouth Daily. Till gone for lungs    Seasonal allergic rhinitis due to other allergic trigger  -     triamcinolone acetonide (KENALOG-40) injection 40 mg  -     predniSONE (DELTASONE) 20 MG tablet; 2qd x 5 for sinus and resp tract    Acute allergic rhinitis  -     fluticasone (FLONASE) 50 MCG/ACT nasal spray; 2 sprays into the nostril(s) as directed by provider Daily.  -     triamcinolone acetonide (KENALOG-40) injection 40 mg  -     predniSONE (DELTASONE) 20 MG tablet; 2qd x 5 for sinus and resp tract      Counseled increasing hydration environmental control measures Roxanna pot use increase Flonase use increase albuterol use short-term medications observation recheck directed

## 2019-05-16 ENCOUNTER — TELEPHONE (OUTPATIENT)
Dept: FAMILY MEDICINE CLINIC | Facility: CLINIC | Age: 74
End: 2019-05-16

## 2019-05-16 NOTE — TELEPHONE ENCOUNTER
EVER SHIELDS WAS SEEN RECENTLY FOR EARACHE..SHE WAS GIVEN ANTIBIOTICS AND STERIODS BUT IS STILL HAVING EAR PAIN AND DIZZINESS NOW..PLEASE CALL HER BACK AS WHAT TO DO NOW?  [phone]

## 2019-05-20 ENCOUNTER — TELEPHONE (OUTPATIENT)
Dept: FAMILY MEDICINE CLINIC | Facility: CLINIC | Age: 74
End: 2019-05-20

## 2019-08-26 ENCOUNTER — OFFICE VISIT (OUTPATIENT)
Dept: FAMILY MEDICINE CLINIC | Facility: CLINIC | Age: 74
End: 2019-08-26

## 2019-08-26 ENCOUNTER — APPOINTMENT (OUTPATIENT)
Dept: LAB | Facility: HOSPITAL | Age: 74
End: 2019-08-26

## 2019-08-26 VITALS
HEIGHT: 65 IN | BODY MASS INDEX: 24.41 KG/M2 | SYSTOLIC BLOOD PRESSURE: 130 MMHG | DIASTOLIC BLOOD PRESSURE: 88 MMHG | WEIGHT: 146.5 LBS

## 2019-08-26 DIAGNOSIS — F41.1 GAD (GENERALIZED ANXIETY DISORDER): Chronic | ICD-10-CM

## 2019-08-26 DIAGNOSIS — I10 ESSENTIAL HYPERTENSION: Primary | Chronic | ICD-10-CM

## 2019-08-26 DIAGNOSIS — Z63.6 CAREGIVER STRESS: ICD-10-CM

## 2019-08-26 DIAGNOSIS — Z12.31 VISIT FOR SCREENING MAMMOGRAM: ICD-10-CM

## 2019-08-26 DIAGNOSIS — R35.0 URINARY FREQUENCY: Primary | ICD-10-CM

## 2019-08-26 PROCEDURE — 99214 OFFICE O/P EST MOD 30 MIN: CPT | Performed by: FAMILY MEDICINE

## 2019-08-26 PROCEDURE — 83735 ASSAY OF MAGNESIUM: CPT | Performed by: FAMILY MEDICINE

## 2019-08-26 PROCEDURE — 81001 URINALYSIS AUTO W/SCOPE: CPT | Performed by: FAMILY MEDICINE

## 2019-08-26 PROCEDURE — 36415 COLL VENOUS BLD VENIPUNCTURE: CPT | Performed by: FAMILY MEDICINE

## 2019-08-26 PROCEDURE — 80053 COMPREHEN METABOLIC PANEL: CPT | Performed by: FAMILY MEDICINE

## 2019-08-26 RX ORDER — FLUOXETINE 10 MG/1
CAPSULE ORAL
Qty: 90 CAPSULE | Refills: 1 | Status: SHIPPED | OUTPATIENT
Start: 2019-08-26 | End: 2020-02-12

## 2019-08-26 RX ORDER — RABEPRAZOLE SODIUM 20 MG/1
20 TABLET, DELAYED RELEASE ORAL DAILY
Qty: 90 TABLET | Refills: 1 | Status: SHIPPED | OUTPATIENT
Start: 2019-08-26 | End: 2020-02-12

## 2019-08-26 SDOH — SOCIAL STABILITY - SOCIAL INSECURITY: DEPENDENT RELATIVE NEEDING CARE AT HOME: Z63.6

## 2019-08-26 NOTE — PROGRESS NOTES
Subjective   Olivia Araujo is a 73 y.o. female.     History of Present Illness   follow-up mild hypertension caregiver stress recurrent UTIs mild dysthymia.  Interim continued problems with care for her  is homebound with a Parkinson's disease but not getting much support.  Counseled on modalities for same.  Time for lab work.  Also time for mammogram.  Is already on Prozac and counseled increasing dosing and perhaps behavioral modification for caregiver stress as well as family help for her .  History noted.    The following portions of the patient's history were reviewed and updated as appropriate: allergies, current medications, past family history, past medical history, past social history, past surgical history and problem list.    Review of Systems   Constitutional: Negative for activity change, appetite change, fatigue and unexpected weight change.   HENT: Negative for trouble swallowing and voice change.    Eyes: Negative for redness and visual disturbance.   Respiratory: Negative for cough and wheezing.    Cardiovascular: Negative for chest pain and palpitations.   Gastrointestinal: Negative for abdominal pain, constipation, diarrhea, nausea and vomiting.   Genitourinary: Negative for urgency.   Musculoskeletal: Negative for joint swelling.   Neurological: Negative for syncope and headaches.   Hematological: Negative for adenopathy.   Psychiatric/Behavioral: Positive for sleep disturbance. The patient is nervous/anxious.        Objective   Physical Exam   Constitutional: She is oriented to person, place, and time. She appears well-developed.   HENT:   Head: Normocephalic.   Nose: Nose normal.   Eyes: Pupils are equal, round, and reactive to light.   Neck: Normal range of motion. No thyromegaly present.   Cardiovascular: Normal rate, regular rhythm, normal heart sounds and intact distal pulses. Exam reveals no gallop and no friction rub.   No murmur heard.  Pulmonary/Chest: Breath sounds  normal.   Abdominal: Soft. She exhibits no distension and no mass. There is no tenderness.   Musculoskeletal: Normal range of motion.   Neurological: She is alert and oriented to person, place, and time. She has normal reflexes.   Skin: Skin is warm and dry.   Psychiatric: Her speech is normal and behavior is normal. Her affect is blunt.   Mild flat affect       Assessment/Plan   Olivia was seen today for follow-up.    Diagnoses and all orders for this visit:    Essential hypertension  -     Comprehensive Metabolic Panel  -     Magnesium    HAL (generalized anxiety disorder)  -     FLUoxetine (PROZAC) 10 MG capsule; For anxiety 1 qd  New dosing    Visit for screening mammogram  -     Mammo Screening Digital Tomosynthesis Bilateral With CAD    Caregiver stress    Other orders  -     RABEprazole (ACIPHEX) 20 MG EC tablet; Take 1 tablet by mouth Daily. Prn reflux       behavior modifications caregiver stress reduction etc. increased Prozac lab work flu vaccine in the fall recheck 6 months

## 2019-08-27 LAB
ALBUMIN SERPL-MCNC: 4.3 G/DL (ref 3.5–5.2)
ALBUMIN/GLOB SERPL: 1.3 G/DL
ALP SERPL-CCNC: 96 U/L (ref 39–117)
ALT SERPL W P-5'-P-CCNC: 8 U/L (ref 1–33)
ANION GAP SERPL CALCULATED.3IONS-SCNC: 13.1 MMOL/L (ref 5–15)
AST SERPL-CCNC: 20 U/L (ref 1–32)
BACTERIA UR QL AUTO: ABNORMAL /HPF
BILIRUB SERPL-MCNC: 0.4 MG/DL (ref 0.2–1.2)
BILIRUB UR QL STRIP: NEGATIVE
BUN BLD-MCNC: 9 MG/DL (ref 8–23)
BUN/CREAT SERPL: 9.9 (ref 7–25)
CALCIUM SPEC-SCNC: 9.5 MG/DL (ref 8.6–10.5)
CHLORIDE SERPL-SCNC: 92 MMOL/L (ref 98–107)
CLARITY UR: ABNORMAL
CO2 SERPL-SCNC: 27.9 MMOL/L (ref 22–29)
COLOR UR: YELLOW
CREAT BLD-MCNC: 0.91 MG/DL (ref 0.57–1)
GFR SERPL CREATININE-BSD FRML MDRD: 60 ML/MIN/1.73
GLOBULIN UR ELPH-MCNC: 3.2 GM/DL
GLUCOSE BLD-MCNC: 86 MG/DL (ref 65–99)
GLUCOSE UR STRIP-MCNC: NEGATIVE MG/DL
HGB UR QL STRIP.AUTO: NEGATIVE
HYALINE CASTS UR QL AUTO: ABNORMAL /LPF
KETONES UR QL STRIP: NEGATIVE
LEUKOCYTE ESTERASE UR QL STRIP.AUTO: ABNORMAL
MAGNESIUM SERPL-MCNC: 2.6 MG/DL (ref 1.6–2.4)
NITRITE UR QL STRIP: POSITIVE
PH UR STRIP.AUTO: 7.5 [PH] (ref 5–8)
POTASSIUM BLD-SCNC: 4.8 MMOL/L (ref 3.5–5.2)
PROT SERPL-MCNC: 7.5 G/DL (ref 6–8.5)
PROT UR QL STRIP: NEGATIVE
RBC # UR: ABNORMAL /HPF
REF LAB TEST METHOD: ABNORMAL
SODIUM BLD-SCNC: 133 MMOL/L (ref 136–145)
SP GR UR STRIP: 1.01 (ref 1–1.03)
SQUAMOUS #/AREA URNS HPF: ABNORMAL /HPF
UROBILINOGEN UR QL STRIP: ABNORMAL
WBC UR QL AUTO: ABNORMAL /HPF

## 2019-08-27 RX ORDER — CIPROFLOXACIN 250 MG/1
250 TABLET, FILM COATED ORAL 2 TIMES DAILY
Qty: 10 TABLET | Refills: 0 | Status: SHIPPED | OUTPATIENT
Start: 2019-08-27 | End: 2020-02-12

## 2019-10-09 ENCOUNTER — HOSPITAL ENCOUNTER (OUTPATIENT)
Dept: CT IMAGING | Facility: HOSPITAL | Age: 74
Discharge: HOME | End: 2019-10-09
Admitting: INTERNAL MEDICINE

## 2019-10-09 ENCOUNTER — TELEPHONE (OUTPATIENT)
Dept: FAMILY MEDICINE CLINIC | Facility: CLINIC | Age: 74
End: 2019-10-09

## 2019-10-09 DIAGNOSIS — R10.84 GENERALIZED ABDOMINAL PAIN: ICD-10-CM

## 2019-10-09 PROCEDURE — 74177 CT ABD & PELVIS W/CONTRAST: CPT

## 2019-10-09 PROCEDURE — 25010000002 IOPAMIDOL 61 % SOLUTION: Performed by: INTERNAL MEDICINE

## 2019-10-09 RX ADMIN — IOPAMIDOL 80 ML: 612 INJECTION, SOLUTION INTRAVENOUS at 14:52

## 2019-10-09 NOTE — TELEPHONE ENCOUNTER
PT CALLED AND STATES THE FLUoxetine (PROZAC) 10 MG capsule IS CAUSING HER DIZZINESS. AND ALSO MESSING WITH HER EYES. SHE STATES SHE DIDN'T TAKE ALL THE TIME AT FIRST BUT SINCE SHE HAS STARTED TAKING ALL THE TIME IT IS BOTHERING HER. WANTS TO KNOW WHAT  MAY WANT HER TO DO. CALL HER [phone]

## 2019-10-09 NOTE — TELEPHONE ENCOUNTER
I spoke with pt and let her know dr. Billingsley said she could just stop taking this med and if she wishes to try something different that she could make an apt to bee seen and discuss other options. She said she would call back to make an apt at a later time.

## 2020-02-12 RX ORDER — SUCRALFATE 1 G/1
1 TABLET ORAL 3 TIMES DAILY
COMMUNITY

## 2020-02-12 RX ORDER — ESOMEPRAZOLE MAGNESIUM 40 MG/1
40 CAPSULE, DELAYED RELEASE ORAL
COMMUNITY

## 2020-02-12 RX ORDER — TRIAMTERENE AND HYDROCHLOROTHIAZIDE 37.5; 25 MG/1; MG/1
1 TABLET ORAL DAILY
COMMUNITY
End: 2020-02-26 | Stop reason: SDUPTHER

## 2020-02-14 ENCOUNTER — HOSPITAL ENCOUNTER (OUTPATIENT)
Facility: HOSPITAL | Age: 75
Setting detail: HOSPITAL OUTPATIENT SURGERY
Discharge: HOME | End: 2020-02-14
Attending: OPHTHALMOLOGY | Admitting: OPHTHALMOLOGY

## 2020-02-14 ENCOUNTER — ANESTHESIA EVENT (OUTPATIENT)
Dept: PERIOP | Facility: HOSPITAL | Age: 75
End: 2020-02-14

## 2020-02-14 ENCOUNTER — TELEPHONE (OUTPATIENT)
Dept: FAMILY MEDICINE CLINIC | Facility: CLINIC | Age: 75
End: 2020-02-14

## 2020-02-14 ENCOUNTER — ANESTHESIA (OUTPATIENT)
Dept: PERIOP | Facility: HOSPITAL | Age: 75
End: 2020-02-14

## 2020-02-14 VITALS
RESPIRATION RATE: 18 BRPM | SYSTOLIC BLOOD PRESSURE: 152 MMHG | WEIGHT: 142.64 LBS | TEMPERATURE: 98.4 F | HEIGHT: 65 IN | BODY MASS INDEX: 23.76 KG/M2 | HEART RATE: 74 BPM | OXYGEN SATURATION: 100 % | DIASTOLIC BLOOD PRESSURE: 71 MMHG

## 2020-02-14 PROCEDURE — 25010000002 FENTANYL CITRATE (PF) 100 MCG/2ML SOLUTION: Performed by: NURSE ANESTHETIST, CERTIFIED REGISTERED

## 2020-02-14 PROCEDURE — V2632 POST CHMBR INTRAOCULAR LENS: HCPCS | Performed by: OPHTHALMOLOGY

## 2020-02-14 PROCEDURE — 25010000002 EPINEPHRINE PER 0.1 MG: Performed by: OPHTHALMOLOGY

## 2020-02-14 PROCEDURE — 25010000002 MIDAZOLAM PER 1 MG: Performed by: NURSE ANESTHETIST, CERTIFIED REGISTERED

## 2020-02-14 PROCEDURE — 25010000002 VANCOMYCIN 1 G RECONSTITUTED SOLUTION 1 EACH VIAL: Performed by: OPHTHALMOLOGY

## 2020-02-14 DEVICE — LENS ACRYSOF IQ 6X13MM SN60WF 19.0: Type: IMPLANTABLE DEVICE | Site: EYE | Status: FUNCTIONAL

## 2020-02-14 RX ORDER — MIDAZOLAM HYDROCHLORIDE 1 MG/ML
INJECTION INTRAMUSCULAR; INTRAVENOUS AS NEEDED
Status: DISCONTINUED | OUTPATIENT
Start: 2020-02-14 | End: 2020-02-14 | Stop reason: SURG

## 2020-02-14 RX ORDER — SODIUM CHLORIDE 0.9 % (FLUSH) 0.9 %
10 SYRINGE (ML) INJECTION AS NEEDED
Status: DISCONTINUED | OUTPATIENT
Start: 2020-02-14 | End: 2020-02-14 | Stop reason: HOSPADM

## 2020-02-14 RX ORDER — PHENYLEPHRINE HCL 2.5 %
1 DROPS OPHTHALMIC (EYE)
Status: COMPLETED | OUTPATIENT
Start: 2020-02-14 | End: 2020-02-14

## 2020-02-14 RX ORDER — BRIMONIDINE TARTRATE 0.15 %
DROPS OPHTHALMIC (EYE) AS NEEDED
Status: DISCONTINUED | OUTPATIENT
Start: 2020-02-14 | End: 2020-02-14 | Stop reason: HOSPADM

## 2020-02-14 RX ORDER — FENTANYL CITRATE 50 UG/ML
INJECTION, SOLUTION INTRAMUSCULAR; INTRAVENOUS AS NEEDED
Status: DISCONTINUED | OUTPATIENT
Start: 2020-02-14 | End: 2020-02-14 | Stop reason: SURG

## 2020-02-14 RX ORDER — CYCLOPENTOLATE HYDROCHLORIDE 10 MG/ML
1 SOLUTION/ DROPS OPHTHALMIC
Status: COMPLETED | OUTPATIENT
Start: 2020-02-14 | End: 2020-02-14

## 2020-02-14 RX ORDER — TETRACAINE HYDROCHLORIDE 5 MG/ML
1 SOLUTION OPHTHALMIC
Status: COMPLETED | OUTPATIENT
Start: 2020-02-14 | End: 2020-02-14

## 2020-02-14 RX ORDER — PREDNISOLONE ACETATE 10 MG/ML
SUSPENSION/ DROPS OPHTHALMIC AS NEEDED
Status: DISCONTINUED | OUTPATIENT
Start: 2020-02-14 | End: 2020-02-14 | Stop reason: HOSPADM

## 2020-02-14 RX ORDER — MOXIFLOXACIN 5 MG/ML
SOLUTION/ DROPS OPHTHALMIC AS NEEDED
Status: DISCONTINUED | OUTPATIENT
Start: 2020-02-14 | End: 2020-02-14 | Stop reason: HOSPADM

## 2020-02-14 RX ORDER — TETRACAINE HYDROCHLORIDE 5 MG/ML
SOLUTION OPHTHALMIC AS NEEDED
Status: DISCONTINUED | OUTPATIENT
Start: 2020-02-14 | End: 2020-02-14 | Stop reason: HOSPADM

## 2020-02-14 RX ADMIN — TETRACAINE HYDROCHLORIDE 1 DROP: 5 SOLUTION OPHTHALMIC at 09:20

## 2020-02-14 RX ADMIN — FENTANYL CITRATE 25 MCG: 50 INJECTION, SOLUTION INTRAMUSCULAR; INTRAVENOUS at 10:06

## 2020-02-14 RX ADMIN — PHENYLEPHRINE HYDROCHLORIDE 1 DROP: 25 SOLUTION/ DROPS OPHTHALMIC at 09:07

## 2020-02-14 RX ADMIN — MIDAZOLAM HYDROCHLORIDE 0.5 MG: 2 INJECTION, SOLUTION INTRAMUSCULAR; INTRAVENOUS at 10:06

## 2020-02-14 RX ADMIN — PHENYLEPHRINE HYDROCHLORIDE 1 DROP: 25 SOLUTION/ DROPS OPHTHALMIC at 09:20

## 2020-02-14 RX ADMIN — CYCLOPENTOLATE HYDROCHLORIDE 1 DROP: 10 SOLUTION/ DROPS OPHTHALMIC at 09:07

## 2020-02-14 RX ADMIN — PHENYLEPHRINE HYDROCHLORIDE 1 DROP: 25 SOLUTION/ DROPS OPHTHALMIC at 08:57

## 2020-02-14 RX ADMIN — CYCLOPENTOLATE HYDROCHLORIDE 1 DROP: 10 SOLUTION/ DROPS OPHTHALMIC at 09:20

## 2020-02-14 RX ADMIN — CYCLOPENTOLATE HYDROCHLORIDE 1 DROP: 10 SOLUTION/ DROPS OPHTHALMIC at 08:56

## 2020-02-14 RX ADMIN — TETRACAINE HYDROCHLORIDE 1 DROP: 5 SOLUTION OPHTHALMIC at 08:56

## 2020-02-14 NOTE — TELEPHONE ENCOUNTER
Would like for you to call her to see about her heart ever skipping beats. Wants to know if she has anything documented about it because they told her today at her cataract surgery that it was skipping beats.

## 2020-02-14 NOTE — ANESTHESIA PREPROCEDURE EVALUATION
Anesthesia Evaluation     Patient summary reviewed and Nursing notes reviewed   no history of anesthetic complications:  NPO Solid Status: > 8 hours  NPO Liquid Status: > 8 hours           Airway   Mallampati: II  TM distance: >3 FB  Neck ROM: full  possible difficult intubation  Dental    (+) upper dentures    Pulmonary - normal exam    breath sounds clear to auscultation  (+) asthma,  (-) not a smoker  Cardiovascular - normal exam    Rhythm: regular  Rate: normal    (+) hypertension, hyperlipidemia,   (-) murmur, carotid bruits      Neuro/Psych  (+) psychiatric history Anxiety,     GI/Hepatic/Renal/Endo    (+)  hiatal hernia, GERD well controlled,      Musculoskeletal (-) negative ROS    Abdominal    Substance History - negative use     OB/GYN negative ob/gyn ROS         Other - negative ROS                       Anesthesia Plan    ASA 3     MAC     intravenous induction     Anesthetic plan, all risks, benefits, and alternatives have been provided, discussed and informed consent has been obtained with: patient and sibling.

## 2020-02-14 NOTE — ANESTHESIA POSTPROCEDURE EVALUATION
Patient: Olivia Araujo    Procedure Summary     Date:  02/14/20 Room / Location:  [facility] OR 06 / [facility] OR    Anesthesia Start:  1006 Anesthesia Stop:  1018    Procedure:  REMOVE CATARACT AND IMPLANT INTRAOCULAR LENS (Right Eye) Diagnosis:       Age-related nuclear cataract of right eye      (age-related nuclear cataract of right eye)    Surgeon:  Sylvain Brunner MD Provider:  Óscar Martin MD    Anesthesia Type:  MAC ASA Status:  3          Anesthesia Type: MAC    Vitals  No vitals data found for the desired time range.          Post Anesthesia Care and Evaluation    Patient location during evaluation: bedside  Patient participation: complete - patient participated  Level of consciousness: awake and alert  Pain score: 0  Pain management: adequate  Airway patency: patent  Anesthetic complications: No anesthetic complications  PONV Status: none  Cardiovascular status: acceptable  Respiratory status: acceptable  Hydration status: acceptable

## 2020-02-26 ENCOUNTER — APPOINTMENT (OUTPATIENT)
Dept: LAB | Facility: HOSPITAL | Age: 75
End: 2020-02-26

## 2020-02-26 ENCOUNTER — OFFICE VISIT (OUTPATIENT)
Dept: FAMILY MEDICINE CLINIC | Facility: CLINIC | Age: 75
End: 2020-02-26

## 2020-02-26 VITALS
WEIGHT: 145.2 LBS | HEIGHT: 65 IN | DIASTOLIC BLOOD PRESSURE: 72 MMHG | SYSTOLIC BLOOD PRESSURE: 132 MMHG | BODY MASS INDEX: 24.19 KG/M2

## 2020-02-26 DIAGNOSIS — Z63.6 CAREGIVER STRESS: Chronic | ICD-10-CM

## 2020-02-26 DIAGNOSIS — Z71.89 ADVANCE CARE PLANNING: ICD-10-CM

## 2020-02-26 DIAGNOSIS — F41.1 GAD (GENERALIZED ANXIETY DISORDER): Chronic | ICD-10-CM

## 2020-02-26 DIAGNOSIS — Z00.00 MEDICARE ANNUAL WELLNESS VISIT, INITIAL: ICD-10-CM

## 2020-02-26 DIAGNOSIS — N39.0 RECURRENT UTI: ICD-10-CM

## 2020-02-26 DIAGNOSIS — I10 ESSENTIAL HYPERTENSION: Primary | Chronic | ICD-10-CM

## 2020-02-26 PROCEDURE — 87088 URINE BACTERIA CULTURE: CPT | Performed by: FAMILY MEDICINE

## 2020-02-26 PROCEDURE — 96160 PT-FOCUSED HLTH RISK ASSMT: CPT | Performed by: FAMILY MEDICINE

## 2020-02-26 PROCEDURE — 87086 URINE CULTURE/COLONY COUNT: CPT | Performed by: FAMILY MEDICINE

## 2020-02-26 PROCEDURE — 87186 SC STD MICRODIL/AGAR DIL: CPT | Performed by: FAMILY MEDICINE

## 2020-02-26 PROCEDURE — G0438 PPPS, INITIAL VISIT: HCPCS | Performed by: FAMILY MEDICINE

## 2020-02-26 PROCEDURE — 99214 OFFICE O/P EST MOD 30 MIN: CPT | Performed by: FAMILY MEDICINE

## 2020-02-26 RX ORDER — TRIAMTERENE AND HYDROCHLOROTHIAZIDE 37.5; 25 MG/1; MG/1
1 TABLET ORAL DAILY
Qty: 45 TABLET | Refills: 3 | Status: SHIPPED | OUTPATIENT
Start: 2020-02-26 | End: 2020-05-12 | Stop reason: SDUPTHER

## 2020-02-26 SDOH — SOCIAL STABILITY - SOCIAL INSECURITY: DEPENDENT RELATIVE NEEDING CARE AT HOME: Z63.6

## 2020-02-26 NOTE — PROGRESS NOTES
Subjective   Olivia Araujo is a 74 y.o. female.     History of Present Illness   reevaluation mild hypertension severe generalized anxiety disorder with some matization history of caregiver stress history of chronic vertigo felt to be multifactorial.  The interim  has succumbed Parkinson's disease  in November.  Proper grief reaction states feeling better from same.  Continues on blood pressure medicine low-dose.  Stopped Prozac.  Sees Dr. Laguna of gastroenterology for recurrent dyspepsia due for colonoscopy this year.  Has had a mammogram.  Has had a flu vaccine.  Continues to have a high anxiety level.  Has had cataract removed concerned about irregular heartbeat although there is no record of same.  Also complaining of draining ears mainly from earwax.  Spent some time today counseling on name as well as advanced care etc.  HPI    The following portions of the patient's history were reviewed and updated as appropriate: allergies, current medications, past family history, past medical history, past social history, past surgical history and problem list.    Review of Systems  Review of Systems   Constitutional: Negative for activity change, appetite change, fatigue and unexpected weight change.   HENT: Positive for ear discharge and ear pain. Negative for trouble swallowing and voice change.    Eyes: Negative for redness and visual disturbance.   Respiratory: Negative for cough and wheezing.    Cardiovascular: Negative for chest pain and palpitations.   Gastrointestinal: Negative for abdominal pain, constipation, diarrhea, nausea and vomiting.   Genitourinary: Positive for frequency (History of recurrent UTIs requesting repeat study will do a culture). Negative for urgency.   Musculoskeletal: Negative for joint swelling.   Neurological: Positive for dizziness. Negative for syncope and headaches.   Hematological: Negative for adenopathy.   Psychiatric/Behavioral: Negative for sleep disturbance. The  "patient is nervous/anxious.        Objective   Physical Exam  Physical Exam   Constitutional: She is oriented to person, place, and time. She appears well-developed.   HENT:   Head: Normocephalic.   Nose: Nose normal.   Mouth/Throat: Oropharynx is clear and moist.   Mild to moderate wax buildup bilaterally no drainage   Eyes: Pupils are equal, round, and reactive to light.   Neck: Normal range of motion. No thyromegaly present.   Cardiovascular: Normal rate, regular rhythm, normal heart sounds and intact distal pulses. Exam reveals no gallop and no friction rub.   No murmur heard.  Number   Pulmonary/Chest: Breath sounds normal.   Abdominal: Soft. She exhibits no distension and no mass. There is no tenderness.   Musculoskeletal: Normal range of motion.   No peripheral edema 2+ pulses   Neurological: She is alert and oriented to person, place, and time. She has normal reflexes.   Skin: Skin is warm and dry.   Psychiatric: Her behavior is normal. Her mood appears anxious. Her speech is rapid and/or pressured and tangential.         Visit Vitals  /72   Ht 165.1 cm (65\")   Wt 65.9 kg (145 lb 3.2 oz)   BMI 24.16 kg/m²     Body mass index is 24.16 kg/m².      Assessment/Plan   Olivia was seen today for hypertension.    Diagnoses and all orders for this visit:    Essential hypertension  -     triamterene-hydrochlorothiazide (MAXZIDE-25) 37.5-25 MG per tablet; Take 1 tablet by mouth Daily. 1/2 tab    HAL (generalized anxiety disorder)    Caregiver stress    Recurrent UTI  -     Urine Culture - Urine, Urine, Clean Catch    Advance care planning    Medicare annual wellness visit, initial    Other orders  -     carbamide peroxide (DEBROX) 6.5 % otic solution; Use  2 x wk for ear wax with irrigation at home    Counseled mainly on positive findings.  Reassurance on questions of health.  Counseled earwax kits counseled increasing hydration continuing regular medicines following up with all subspecialist.  Advance Care " Planning   ACP discussion was held with the patient during this visit. Patient does not have an advance directive, information provided.  Urine order as above recheck 6 months be time for lab

## 2020-02-26 NOTE — PROGRESS NOTES
The ABCs of the Annual Wellness Visit  Initial Medicare Wellness Visit    Chief Complaint   Patient presents with   • Hypertension     6 MONTH REVAL       Subjective   History of Present Illness:  Olivia Araujo is a 74 y.o. female who presents for an Initial Medicare Wellness Visit.    HEALTH RISK ASSESSMENT    Recent Hospitalizations:  No hospitalization(s) within the last year.    Current Medical Providers:  Patient Care Team:  Shaun Billingsley MD as PCP - General (Family Medicine)  Hannah Rubin MA as Medical Assistant  Janes Keene MA as Medical Assistant    Smoking Status:  Social History     Tobacco Use   Smoking Status Never Smoker   Smokeless Tobacco Never Used       Alcohol Consumption:  Social History     Substance and Sexual Activity   Alcohol Use No       Depression Screen:   PHQ-2/PHQ-9 Depression Screening 2/26/2020   Little interest or pleasure in doing things -   Feeling down, depressed, or hopeless 1   Total Score 1       Fall Risk Screen:  STEADI Fall Risk Assessment has not been completed.    Health Habits and Functional and Cognitive Screening:  No flowsheet data found.      Does the patient have evidence of cognitive impairment? No    Asprin use counseling:Does not need ASA (and currently is not on it)    Age-appropriate Screening Schedule:  Refer to the list below for future screening recommendations based on patient's age, sex and/or medical conditions. Orders for these recommended tests are listed in the plan section. The patient has been provided with a written plan.    Health Maintenance   Topic Date Due   • ZOSTER VACCINE (1 of 2) 06/13/2020 (Originally 9/14/1995)   • MAMMOGRAM  09/16/2021   • COLONOSCOPY  06/01/2027   • TDAP/TD VACCINES (2 - Td) 10/31/2027   • INFLUENZA VACCINE  Completed   • LIPID PANEL  Discontinued          The following portions of the patient's history were reviewed and updated as appropriate: allergies, current medications, past family history, past  medical history, past social history, past surgical history and problem list.    Outpatient Medications Prior to Visit   Medication Sig Dispense Refill   • albuterol 108 (90 Base) MCG/ACT inhaler Inhale 2 puffs Every 4 (Four) Hours As Needed for Wheezing. 1 inhaler 11   • aspirin 325 MG tablet Take 325 mg by mouth daily. 1 tablet every day with lunch Oral     • Cholecalciferol (VITAMIN D-3 PO) Take 2,000 mg by mouth daily.     • esomeprazole (nexIUM) 40 MG capsule Take 40 mg by mouth Every Morning Before Breakfast.     • fexofenadine (ALLEGRA) 60 MG tablet Take 60 mg by mouth Daily.     • fluticasone (FLONASE) 50 MCG/ACT nasal spray 2 sprays into the nostril(s) as directed by provider Daily. 1 bottle 11   • Multiple Vitamin (MULTI-VITAMINS PO) Take  by mouth Daily.     • Multiple Vitamins-Minerals (GNP HEALTHY EYES SUPERVISION PO) Take  by mouth.     • Polyethyl Glycol-Propyl Glycol (SYSTANE OP) Administer  to both eyes 2 (Two) Times a Day.     • sucralfate (CARAFATE) 1 g tablet Take 1 g by mouth 3 (Three) Times a Day.     • vitamin B-12 (CYANOCOBALAMIN) 1000 MCG tablet Take 1,000 mcg by mouth Daily.     • triamterene-hydrochlorothiazide (MAXZIDE-25) 37.5-25 MG per tablet Take 1 tablet by mouth Daily. 1/2 tab       No facility-administered medications prior to visit.        Patient Active Problem List   Diagnosis   • Keratitis sicca, bilateral   • Cortical age-related cataract   • Nuclear sclerosis   • Posterior subcapsular polar age-related cataract   • Nuclear cataract   • Vitamin D deficiency   • GERD (gastroesophageal reflux disease)   • Essential hypertension   • Allergic rhinitis   • HAL (generalized anxiety disorder)   • Dry eye       Advanced Care Planning:  ACP discussion was held with the patient during this visit. Patient does not have an advance directive, information provided.    Review of Systems    Compared to one year ago, the patient feels her physical health is the same.  Compared to one year ago,  "the patient feels her mental health is the same.    Reviewed chart for potential of high risk medication in the elderly: not applicable  Reviewed chart for potential of harmful drug interactions in the elderly:not applicable    Objective         Vitals:    02/26/20 1251   BP: 132/72   Weight: 65.9 kg (145 lb 3.2 oz)   Height: 165.1 cm (65\")   PainSc: 0-No pain       Body mass index is 24.16 kg/m².  Discussed the patient's BMI with her. The BMI is in the acceptable range.    Physical Exam          Assessment/Plan   Medicare Risks and Personalized Health Plan  CMS Preventative Services Quick Reference  Advance Directive Discussion  Depression/Dysphoria  Fall Risk    The above risks/problems have been discussed with the patient.  Pertinent information has been shared with the patient in the After Visit Summary.  Follow up plans and orders are seen below in the Assessment/Plan Section.    Diagnoses and all orders for this visit:    1. Essential hypertension (Primary)  -     triamterene-hydrochlorothiazide (MAXZIDE-25) 37.5-25 MG per tablet; Take 1 tablet by mouth Daily. 1/2 tab  Dispense: 45 tablet; Refill: 3    2. HAL (generalized anxiety disorder)    3. Caregiver stress    4. Recurrent UTI  -     Urine Culture - Urine, Urine, Clean Catch    5. Advance care planning    6. Medicare annual wellness visit, initial    Other orders  -     carbamide peroxide (DEBROX) 6.5 % otic solution; Use  2 x wk for ear wax with irrigation at home  Dispense: 1 each; Refill: 11      Follow Up:  No follow-ups on file.     An After Visit Summary and PPPS were given to the patient.       See previous note    "

## 2020-02-28 LAB — BACTERIA SPEC AEROBE CULT: ABNORMAL

## 2020-02-28 RX ORDER — CEFUROXIME AXETIL 250 MG/1
TABLET ORAL
Qty: 14 TABLET | Refills: 0 | Status: SHIPPED | OUTPATIENT
Start: 2020-02-28 | End: 2020-05-12

## 2020-03-31 ENCOUNTER — TELEPHONE (OUTPATIENT)
Dept: FAMILY MEDICINE CLINIC | Facility: CLINIC | Age: 75
End: 2020-03-31

## 2020-05-08 ENCOUNTER — TELEPHONE (OUTPATIENT)
Dept: FAMILY MEDICINE CLINIC | Facility: CLINIC | Age: 75
End: 2020-05-08

## 2020-05-08 DIAGNOSIS — J30.9 ACUTE ALLERGIC RHINITIS: ICD-10-CM

## 2020-05-08 RX ORDER — FLUTICASONE PROPIONATE 50 MCG
SPRAY, SUSPENSION (ML) NASAL
Qty: 16 G | Refills: 11 | Status: SHIPPED | OUTPATIENT
Start: 2020-05-08 | End: 2020-10-20

## 2020-05-08 NOTE — TELEPHONE ENCOUNTER
Patient called and stated that she would like a B12 shot sent to [facility].    Patient can be contacted at [phone].

## 2020-05-12 ENCOUNTER — OFFICE VISIT (OUTPATIENT)
Dept: FAMILY MEDICINE CLINIC | Facility: CLINIC | Age: 75
End: 2020-05-12

## 2020-05-12 VITALS
DIASTOLIC BLOOD PRESSURE: 80 MMHG | WEIGHT: 145 LBS | BODY MASS INDEX: 24.16 KG/M2 | SYSTOLIC BLOOD PRESSURE: 122 MMHG | HEIGHT: 65 IN

## 2020-05-12 DIAGNOSIS — F41.1 GAD (GENERALIZED ANXIETY DISORDER): Chronic | ICD-10-CM

## 2020-05-12 DIAGNOSIS — I10 ESSENTIAL HYPERTENSION: Primary | Chronic | ICD-10-CM

## 2020-05-12 DIAGNOSIS — E87.1 HYPONATREMIA: ICD-10-CM

## 2020-05-12 DIAGNOSIS — R26.81 UNSTEADY GAIT: ICD-10-CM

## 2020-05-12 DIAGNOSIS — Z86.39 HISTORY OF NON ANEMIC VITAMIN B12 DEFICIENCY: ICD-10-CM

## 2020-05-12 PROCEDURE — 96372 THER/PROPH/DIAG INJ SC/IM: CPT | Performed by: FAMILY MEDICINE

## 2020-05-12 PROCEDURE — 99214 OFFICE O/P EST MOD 30 MIN: CPT | Performed by: FAMILY MEDICINE

## 2020-05-12 RX ORDER — TRIAMTERENE AND HYDROCHLOROTHIAZIDE 37.5; 25 MG/1; MG/1
TABLET ORAL
Qty: 45 TABLET | Refills: 3 | Status: SHIPPED | OUTPATIENT
Start: 2020-05-12 | End: 2020-05-15

## 2020-05-12 RX ORDER — CYANOCOBALAMIN 1000 UG/ML
1000 INJECTION, SOLUTION INTRAMUSCULAR; SUBCUTANEOUS
Status: DISCONTINUED | OUTPATIENT
Start: 2020-05-12 | End: 2020-10-20

## 2020-05-12 RX ADMIN — CYANOCOBALAMIN 1000 MCG: 1000 INJECTION, SOLUTION INTRAMUSCULAR; SUBCUTANEOUS at 15:00

## 2020-05-12 NOTE — PROGRESS NOTES
Subjective   Olivia Araujo is a 74 y.o. female.     History of Present Illness requesting evaluation of possible B12 deficiency unsteady gait generalized anxiety disorder delayed grief reaction.  Patient states it continues to be dizzy unsteady gait symptoms are more related to more cerebellar unsteadiness when walking.  Enforces some element of HAL as well.  Spouse  about 6 months ago.  Was primary caregiver.  Was borderline hyponatremic With last lab work.  HPI    The following portions of the patient's history were reviewed and updated as appropriate: allergies, current medications, past family history, past medical history, past social history, past surgical history and problem list.    Review of Systems  Review of Systems   Constitutional: Negative for activity change, appetite change, fatigue and unexpected weight change.   HENT: Negative for trouble swallowing and voice change.    Eyes: Negative for redness and visual disturbance.   Respiratory: Negative for cough and wheezing.    Cardiovascular: Negative for chest pain and palpitations.   Gastrointestinal: Negative for abdominal pain, constipation, diarrhea, nausea and vomiting.   Genitourinary: Negative for urgency.   Musculoskeletal: Positive for gait problem. Negative for joint swelling.   Neurological: Positive for dizziness and light-headedness. Negative for syncope and headaches.   Hematological: Negative for adenopathy.   Psychiatric/Behavioral: Positive for dysphoric mood. Negative for sleep disturbance. The patient is nervous/anxious.        Objective   Physical Exam  Physical Exam   Constitutional: She is oriented to person, place, and time. She appears well-developed.   HENT:   Head: Normocephalic.   Eyes: Pupils are equal, round, and reactive to light.   Neck: Normal range of motion. No thyromegaly present.   Cardiovascular: Normal rate, regular rhythm, normal heart sounds and intact distal pulses. Exam reveals no gallop and no friction  "rub.   No murmur heard.  Pulmonary/Chest: Breath sounds normal.   Abdominal: Soft. She exhibits no distension and no mass. There is no tenderness.   Musculoskeletal: Normal range of motion.   Neurological: She is alert and oriented to person, place, and time. She has normal strength and normal reflexes. No cranial nerve deficit or sensory deficit.   Romberg is equivocal with eyes closed.  Gait is not unsteady initially.  Get up and go 3 to 4 seconds.   Skin: Skin is warm and dry.   Psychiatric: Her speech is normal. Her mood appears anxious. She is slowed.   Mild flat affect         Visit Vitals  /80   Ht 165.1 cm (65\")   Wt 65.8 kg (145 lb)   BMI 24.13 kg/m²     Body mass index is 24.13 kg/m².      Assessment/Plan   Olivia was seen today for dizziness and fatigue.    Diagnoses and all orders for this visit:    Essential hypertension  -     Comprehensive Metabolic Panel; Future  -     Magnesium; Future  -     triamterene-hydrochlorothiazide (MAXZIDE-25) 37.5-25 MG per tablet; 1/2 tab  Qd for bp    Hyponatremia  -     Comprehensive Metabolic Panel; Future  -     Magnesium; Future    Unsteady gait  -     CBC (No Diff); Future    History of non anemic vitamin B12 deficiency  -     cyanocobalamin injection 1,000 mcg  -     Vitamin B12; Future    HAL (generalized anxiety disorder)    Suspect very organic and inorganic always however given history and exam need to recheck sodium B12 levels.  Neuro exam except for the Romberg essentially unremarkable.  Orders as above we will follow-up based on results  "

## 2020-05-14 ENCOUNTER — LAB (OUTPATIENT)
Dept: LAB | Facility: HOSPITAL | Age: 75
End: 2020-05-14

## 2020-05-14 DIAGNOSIS — Z86.39 HISTORY OF NON ANEMIC VITAMIN B12 DEFICIENCY: ICD-10-CM

## 2020-05-14 DIAGNOSIS — E87.1 HYPONATREMIA: ICD-10-CM

## 2020-05-14 DIAGNOSIS — I10 ESSENTIAL HYPERTENSION: Chronic | ICD-10-CM

## 2020-05-14 DIAGNOSIS — R26.81 UNSTEADY GAIT: ICD-10-CM

## 2020-05-14 LAB
ALBUMIN SERPL-MCNC: 4.2 G/DL (ref 3.5–5.2)
ALBUMIN/GLOB SERPL: 1.3 G/DL
ALP SERPL-CCNC: 110 U/L (ref 39–117)
ALT SERPL W P-5'-P-CCNC: 10 U/L (ref 1–33)
ANION GAP SERPL CALCULATED.3IONS-SCNC: 13.2 MMOL/L (ref 5–15)
AST SERPL-CCNC: 20 U/L (ref 1–32)
BILIRUB SERPL-MCNC: 0.4 MG/DL (ref 0.2–1.2)
BUN BLD-MCNC: 10 MG/DL (ref 8–23)
BUN/CREAT SERPL: 9.1 (ref 7–25)
CALCIUM SPEC-SCNC: 9.4 MG/DL (ref 8.6–10.5)
CHLORIDE SERPL-SCNC: 92 MMOL/L (ref 98–107)
CO2 SERPL-SCNC: 25.8 MMOL/L (ref 22–29)
CREAT BLD-MCNC: 1.1 MG/DL (ref 0.57–1)
DEPRECATED RDW RBC AUTO: 43.1 FL (ref 37–54)
ERYTHROCYTE [DISTWIDTH] IN BLOOD BY AUTOMATED COUNT: 12.9 % (ref 12.3–15.4)
GFR SERPL CREATININE-BSD FRML MDRD: 48 ML/MIN/1.73
GLOBULIN UR ELPH-MCNC: 3.2 GM/DL
GLUCOSE BLD-MCNC: 171 MG/DL (ref 65–99)
HCT VFR BLD AUTO: 38.8 % (ref 34–46.6)
HGB BLD-MCNC: 13.4 G/DL (ref 12–15.9)
MAGNESIUM SERPL-MCNC: 2.5 MG/DL (ref 1.6–2.4)
MCH RBC QN AUTO: 31.8 PG (ref 26.6–33)
MCHC RBC AUTO-ENTMCNC: 34.5 G/DL (ref 31.5–35.7)
MCV RBC AUTO: 92.2 FL (ref 79–97)
PLATELET # BLD AUTO: 314 10*3/MM3 (ref 140–450)
PMV BLD AUTO: 10.2 FL (ref 6–12)
POTASSIUM BLD-SCNC: 4.5 MMOL/L (ref 3.5–5.2)
PROT SERPL-MCNC: 7.4 G/DL (ref 6–8.5)
RBC # BLD AUTO: 4.21 10*6/MM3 (ref 3.77–5.28)
SODIUM BLD-SCNC: 131 MMOL/L (ref 136–145)
VIT B12 BLD-MCNC: >2000 PG/ML (ref 211–946)
WBC NRBC COR # BLD: 7.02 10*3/MM3 (ref 3.4–10.8)

## 2020-05-14 PROCEDURE — 80053 COMPREHEN METABOLIC PANEL: CPT

## 2020-05-14 PROCEDURE — 85027 COMPLETE CBC AUTOMATED: CPT

## 2020-05-14 PROCEDURE — 36415 COLL VENOUS BLD VENIPUNCTURE: CPT

## 2020-05-14 PROCEDURE — 83735 ASSAY OF MAGNESIUM: CPT

## 2020-05-14 PROCEDURE — 82607 VITAMIN B-12: CPT

## 2020-05-15 ENCOUNTER — TELEPHONE (OUTPATIENT)
Dept: FAMILY MEDICINE CLINIC | Facility: CLINIC | Age: 75
End: 2020-05-15

## 2020-05-15 RX ORDER — AMLODIPINE BESYLATE 5 MG/1
5 TABLET ORAL DAILY
Qty: 90 TABLET | Refills: 1 | Status: SHIPPED | OUTPATIENT
Start: 2020-05-15 | End: 2020-08-12 | Stop reason: HOSPADM

## 2020-05-15 NOTE — TELEPHONE ENCOUNTER
Patient called in requesting a copy of her labs from yesterday be sent out to her home. Address has been confirmed. For any questions or issues, please call patient back at [phone]

## 2020-05-18 ENCOUNTER — TELEPHONE (OUTPATIENT)
Dept: FAMILY MEDICINE CLINIC | Facility: CLINIC | Age: 75
End: 2020-05-18

## 2020-05-18 NOTE — TELEPHONE ENCOUNTER
Pt is wanting to know if she is supposed to take both the Maxzide and the amlodipine.  Please call, thanks!

## 2020-08-11 ENCOUNTER — TELEPHONE (OUTPATIENT)
Dept: FAMILY MEDICINE CLINIC | Facility: CLINIC | Age: 75
End: 2020-08-11

## 2020-08-11 NOTE — TELEPHONE ENCOUNTER
I was told to send these to the Providers when they come through because they are scheduled for a Hospital Follow-up, has something to do with coding

## 2020-08-11 NOTE — TELEPHONE ENCOUNTER
Dr. Billingsley,    I am so sorry, I should not have sent this to you, I should have sent it to Hannah.  I have called the patient and we will get her an appointment set up to come in for evaluation.     Thank you  KRISTI Combs

## 2020-08-11 NOTE — TELEPHONE ENCOUNTER
Caller: Olivia Araujo    Relationship: Self    Best call back number: [phone]    What medications are you currently taking:   Current Outpatient Medications on File Prior to Visit   Medication Sig Dispense Refill   • albuterol 108 (90 Base) MCG/ACT inhaler Inhale 2 puffs Every 4 (Four) Hours As Needed for Wheezing. 1 inhaler 11   • amLODIPine (NORVASC) 5 MG tablet Take 1 tablet by mouth Daily. For bp 90 tablet 1   • aspirin 325 MG tablet Take 325 mg by mouth daily. 1 tablet every day with lunch Oral     • carbamide peroxide (DEBROX) 6.5 % otic solution Use  2 x wk for ear wax with irrigation at home 1 each 11   • Cholecalciferol (VITAMIN D-3 PO) Take 2,000 mg by mouth daily.     • esomeprazole (nexIUM) 40 MG capsule Take 40 mg by mouth Every Morning Before Breakfast.     • fexofenadine (ALLEGRA) 60 MG tablet Take 60 mg by mouth Daily.     • fluticasone (FLONASE) 50 MCG/ACT nasal spray INHALE 2 SPRAYS IN THE NOSTRIL(S) AS DIRECTED BY THE PROVIDER DAILY 16 g 11   • Multiple Vitamin (MULTI-VITAMINS PO) Take  by mouth Daily.     • Multiple Vitamins-Minerals (GNP HEALTHY EYES SUPERVISION PO) Take  by mouth.     • Polyethyl Glycol-Propyl Glycol (SYSTANE OP) Administer  to both eyes 2 (Two) Times a Day.     • sucralfate (CARAFATE) 1 g tablet Take 1 g by mouth 3 (Three) Times a Day.     • vitamin B-12 (CYANOCOBALAMIN) 1000 MCG tablet Take 1,000 mcg by mouth Daily.       Current Facility-Administered Medications on File Prior to Visit   Medication Dose Route Frequency Provider Last Rate Last Dose   • cyanocobalamin injection 1,000 mcg  1,000 mcg Intramuscular Q28 Days Shaun Billingsley MD   1,000 mcg at 05/12/20 1500        When did you start taking these medications: 5/15/20    Which medication are you concerned about: amLODIPine (NORVASC) 5 MG tablet    Who prescribed you this medication: Dr. Billingsley    What are your concerns: states she has been experiencing headaches and dizziness and believes this RX is messing  with her nervous system    How long have you been taking these medications: since May 2020     How long have you had these concerns: for a few weeks, but noticed it more recently.    Additional notes: patient states she is not going to take this medication anymore and is needing to know if an alternative can be prescribed. Please and call patient back to discuss.

## 2020-08-12 ENCOUNTER — OFFICE VISIT (OUTPATIENT)
Dept: FAMILY MEDICINE CLINIC | Facility: CLINIC | Age: 75
End: 2020-08-12

## 2020-08-12 ENCOUNTER — LAB (OUTPATIENT)
Dept: LAB | Facility: HOSPITAL | Age: 75
End: 2020-08-12

## 2020-08-12 VITALS
SYSTOLIC BLOOD PRESSURE: 132 MMHG | DIASTOLIC BLOOD PRESSURE: 80 MMHG | WEIGHT: 145 LBS | HEIGHT: 65 IN | BODY MASS INDEX: 24.16 KG/M2

## 2020-08-12 DIAGNOSIS — F41.1 GAD (GENERALIZED ANXIETY DISORDER): Chronic | ICD-10-CM

## 2020-08-12 DIAGNOSIS — E87.1 HYPONATREMIA: ICD-10-CM

## 2020-08-12 DIAGNOSIS — I10 ESSENTIAL HYPERTENSION: Chronic | ICD-10-CM

## 2020-08-12 DIAGNOSIS — R42 DIZZINESS: ICD-10-CM

## 2020-08-12 DIAGNOSIS — R51.9 NONINTRACTABLE HEADACHE, UNSPECIFIED CHRONICITY PATTERN, UNSPECIFIED HEADACHE TYPE: Primary | ICD-10-CM

## 2020-08-12 LAB
ANION GAP SERPL CALCULATED.3IONS-SCNC: 10 MMOL/L (ref 5–15)
BUN SERPL-MCNC: 12 MG/DL (ref 8–23)
BUN/CREAT SERPL: 11.9 (ref 7–25)
CALCIUM SPEC-SCNC: 9.6 MG/DL (ref 8.6–10.5)
CHLORIDE SERPL-SCNC: 100 MMOL/L (ref 98–107)
CO2 SERPL-SCNC: 28 MMOL/L (ref 22–29)
CREAT SERPL-MCNC: 1.01 MG/DL (ref 0.57–1)
GFR SERPL CREATININE-BSD FRML MDRD: 53 ML/MIN/1.73
GLUCOSE SERPL-MCNC: 92 MG/DL (ref 65–99)
POTASSIUM SERPL-SCNC: 4.3 MMOL/L (ref 3.5–5.2)
SODIUM SERPL-SCNC: 138 MMOL/L (ref 136–145)

## 2020-08-12 PROCEDURE — 36415 COLL VENOUS BLD VENIPUNCTURE: CPT | Performed by: FAMILY MEDICINE

## 2020-08-12 PROCEDURE — 99214 OFFICE O/P EST MOD 30 MIN: CPT | Performed by: FAMILY MEDICINE

## 2020-08-12 PROCEDURE — 80048 BASIC METABOLIC PNL TOTAL CA: CPT | Performed by: FAMILY MEDICINE

## 2020-08-12 NOTE — PROGRESS NOTES
Subjective   Olivia Araujo is a 74 y.o. female.  Requesting evaluation dizziness nausea headache.  Very vague symptomatology unfortunately a great deal of generalized anxiety as well.  Recent life up evils as mentioned in previous note.  Also last note of sodium was found to be low 131 we had stopped diuretic start low-dose amlodipine.  Thought that was causing the problem she had not been taking amlodipine for 2 days.  Pressure still holding about 130/80.  Of note just had cataract surgery recently and is using steroid drops from her ophthalmologist may be part of the problem.  Unfortunate again a great deal of HAL as well.    History of Present Illness   HPI    The following portions of the patient's history were reviewed and updated as appropriate: allergies, current medications, past family history, past medical history, past social history, past surgical history and problem list.    Review of Systems  Review of Systems   Constitutional: Negative for activity change, appetite change, fatigue and unexpected weight change.   HENT: Negative for trouble swallowing and voice change.    Eyes: Negative for redness and visual disturbance.   Respiratory: Negative for cough and wheezing.    Cardiovascular: Negative for chest pain and palpitations.   Gastrointestinal: Negative for abdominal pain, constipation, diarrhea, nausea and vomiting.   Genitourinary: Negative for urgency.   Musculoskeletal: Negative for joint swelling.   Neurological: Positive for dizziness and headaches. Negative for syncope.   Hematological: Negative for adenopathy.   Psychiatric/Behavioral: Negative for sleep disturbance. The patient is nervous/anxious.        Objective   Physical Exam  Physical Exam   Constitutional: She is oriented to person, place, and time. She appears well-developed.   HENT:   Head: Normocephalic.   Eyes: Pupils are equal, round, and reactive to light.   Neck: Normal range of motion. No tracheal deviation present. No  "thyromegaly present.   Cardiovascular: Normal rate, regular rhythm, normal heart sounds and intact distal pulses. Exam reveals no gallop and no friction rub.   No murmur heard.  Pulmonary/Chest: Breath sounds normal.   Abdominal: Soft. She exhibits no distension and no mass. There is no tenderness.   Musculoskeletal: Normal range of motion.   Neurological: She is alert and oriented to person, place, and time. She has normal reflexes.   Reflex Scores:       Patellar reflexes are 2+ on the right side and 2+ on the left side.  Get up and go 3 seconds gait normal   Skin: Skin is warm and dry.   Psychiatric: Her mood appears anxious. Her speech is rapid and/or pressured and tangential. She exhibits abnormal recent memory (Some short-term issues).         Visit Vitals  /80   Ht 165.1 cm (65\")   Wt 65.8 kg (145 lb)   BMI 24.13 kg/m²     Body mass index is 24.13 kg/m².      Assessment/Plan   Olivia was seen today for headache, dizziness and nausea.    Diagnoses and all orders for this visit:    Nonintractable headache, unspecified chronicity pattern, unspecified headache type    Essential hypertension    Dizziness    Hyponatremia  -     Basic Metabolic Panel    HAL (generalized anxiety disorder)    Counseled that suspect amlodipine does not cross the blood-brain barrier therefore I doubt is causing CNS effects.  Suspect may be combination of HAL may be mild cognitive decline and/or drops and other.  We do need to repeat serum sodium to ensure it is not low.  Have counseled on checking blood pressure off medication every 2 to 3 days at home as long as it stays a systolic 130 or less stay off medicine.  We will follow-up based on results otherwise recheck 2 weeks  "

## 2020-08-26 ENCOUNTER — OFFICE VISIT (OUTPATIENT)
Dept: FAMILY MEDICINE CLINIC | Facility: CLINIC | Age: 75
End: 2020-08-26

## 2020-08-26 VITALS
DIASTOLIC BLOOD PRESSURE: 72 MMHG | SYSTOLIC BLOOD PRESSURE: 128 MMHG | HEIGHT: 65 IN | BODY MASS INDEX: 24.16 KG/M2 | WEIGHT: 145 LBS

## 2020-08-26 DIAGNOSIS — L98.491 CALLOUS ULCER, LIMITED TO BREAKDOWN OF SKIN (HCC): Primary | ICD-10-CM

## 2020-08-26 DIAGNOSIS — E53.8 B12 DEFICIENCY: ICD-10-CM

## 2020-08-26 DIAGNOSIS — E87.1 HYPONATREMIA: ICD-10-CM

## 2020-08-26 DIAGNOSIS — Z12.31 BREAST CANCER SCREENING BY MAMMOGRAM: ICD-10-CM

## 2020-08-26 DIAGNOSIS — I10 ESSENTIAL HYPERTENSION: Primary | Chronic | ICD-10-CM

## 2020-08-26 DIAGNOSIS — F41.1 GAD (GENERALIZED ANXIETY DISORDER): Chronic | ICD-10-CM

## 2020-08-26 DIAGNOSIS — M20.62 DEFORMITY OF TOE OF LEFT FOOT: ICD-10-CM

## 2020-08-26 PROCEDURE — 96372 THER/PROPH/DIAG INJ SC/IM: CPT | Performed by: FAMILY MEDICINE

## 2020-08-26 PROCEDURE — 99213 OFFICE O/P EST LOW 20 MIN: CPT | Performed by: FAMILY MEDICINE

## 2020-08-26 RX ORDER — AMLODIPINE BESYLATE 5 MG/1
5 TABLET ORAL DAILY
Qty: 90 TABLET | Refills: 3 | Status: SHIPPED | OUTPATIENT
Start: 2020-08-26 | End: 2021-08-26

## 2020-08-26 RX ORDER — CYANOCOBALAMIN 1000 UG/ML
1000 INJECTION, SOLUTION INTRAMUSCULAR; SUBCUTANEOUS
Status: DISCONTINUED | OUTPATIENT
Start: 2020-08-26 | End: 2020-10-20

## 2020-08-26 RX ADMIN — CYANOCOBALAMIN 1000 MCG: 1000 INJECTION, SOLUTION INTRAMUSCULAR; SUBCUTANEOUS at 13:01

## 2020-08-26 NOTE — PROGRESS NOTES
Subjective   Olivia Araujo is a 74 y.o. female.  Reevaluation hypertension hyponatremia generalized anxiety disorder probable some early cognitive deficit.  In the interim start checking blood pressure top number started to creep up into the 140s got back on amlodipine 5 mg daily now has no symptoms or side effects.  States dizziness is some better but again off and on mainly related to anxiety.  Continues to have some element of mild short-term memory issue.  Also states supposed to be getting B12 injections monthly but apparently for some reason has not regular confusion as to whether she was getting them at home apparently not we need to start that.  Medicines have been reviewed.  Is due for mammogram next month.    History of Present Illness   HPI    The following portions of the patient's history were reviewed and updated as appropriate: allergies, current medications, past family history, past medical history, past social history, past surgical history and problem list.    Review of Systems  Review of Systems   Constitutional: Negative for activity change, appetite change, fatigue and unexpected weight change.   HENT: Negative for trouble swallowing and voice change.    Eyes: Negative for redness and visual disturbance.   Respiratory: Negative for cough and wheezing.    Cardiovascular: Negative for chest pain and palpitations.   Gastrointestinal: Negative for abdominal pain, constipation, diarrhea, nausea and vomiting.   Genitourinary: Negative for urgency.   Musculoskeletal: Negative for joint swelling.   Neurological: Positive for dizziness. Negative for syncope and headaches.   Hematological: Negative for adenopathy.   Psychiatric/Behavioral: Positive for confusion. Negative for sleep disturbance. The patient is nervous/anxious.        Objective   Physical Exam  Physical Exam   Constitutional: She is oriented to person, place, and time. She appears well-developed.   HENT:   Head: Normocephalic.   Right  "Ear: External ear normal.   Mouth/Throat: No oropharyngeal exudate.   Eyes: Pupils are equal, round, and reactive to light.   Neck: Normal range of motion. No thyromegaly present.   Cardiovascular: Normal rate, regular rhythm, normal heart sounds and intact distal pulses. Exam reveals no gallop and no friction rub.   No murmur heard.  Pulmonary/Chest: Breath sounds normal.   Abdominal: Soft. She exhibits no distension and no mass. There is no tenderness.   Musculoskeletal: Normal range of motion.   Neurological: She is alert and oriented to person, place, and time. She has normal reflexes.   Skin: Skin is warm and dry.   Psychiatric: Her mood appears anxious. Her speech is delayed and tangential. She exhibits abnormal recent memory.     Results for orders placed or performed in visit on 08/12/20   Basic Metabolic Panel   Result Value Ref Range    Glucose 92 65 - 99 mg/dL    BUN 12 8 - 23 mg/dL    Creatinine 1.01 (H) 0.57 - 1.00 mg/dL    Sodium 138 136 - 145 mmol/L    Potassium 4.3 3.5 - 5.2 mmol/L    Chloride 100 98 - 107 mmol/L    CO2 28.0 22.0 - 29.0 mmol/L    Calcium 9.6 8.6 - 10.5 mg/dL    eGFR Non African Amer 53 (L) >60 mL/min/1.73    BUN/Creatinine Ratio 11.9 7.0 - 25.0    Anion Gap 10.0 5.0 - 15.0 mmol/L         Visit Vitals  /72   Ht 165.1 cm (65\")   Wt 65.8 kg (145 lb)   BMI 24.13 kg/m²     Body mass index is 24.13 kg/m².      Assessment/Plan   Olivia was seen today for hypertension.    Diagnoses and all orders for this visit:    Essential hypertension  -     amLODIPine (NORVASC) 5 MG tablet; Take 1 tablet by mouth Daily. For bp    HAL (generalized anxiety disorder)    Hyponatremia    Breast cancer screening by mammogram  -     Mammo Screening Digital Tomosynthesis Bilateral With CAD; Future    B12 deficiency  -     cyanocobalamin injection 1,000 mcg    Counseled on try to maintain some element of her daily routine getting out of the house does have someone who helps her travel.  Counseled on staying " hydrated fall prevention infection prevention.  Monthly B12 injections continue amlodipine orders as above recheck 3 months

## 2020-09-09 ENCOUNTER — CLINICAL SUPPORT (OUTPATIENT)
Dept: FAMILY MEDICINE CLINIC | Facility: CLINIC | Age: 75
End: 2020-09-09

## 2020-09-09 PROCEDURE — 96372 THER/PROPH/DIAG INJ SC/IM: CPT | Performed by: FAMILY MEDICINE

## 2020-09-09 RX ADMIN — CYANOCOBALAMIN 1000 MCG: 1000 INJECTION, SOLUTION INTRAMUSCULAR; SUBCUTANEOUS at 12:55

## 2020-09-22 ENCOUNTER — OFFICE VISIT (OUTPATIENT)
Dept: PODIATRY | Facility: CLINIC | Age: 75
End: 2020-09-22

## 2020-09-22 VITALS — WEIGHT: 145 LBS | OXYGEN SATURATION: 97 % | BODY MASS INDEX: 24.16 KG/M2 | HEART RATE: 92 BPM | HEIGHT: 65 IN

## 2020-09-22 DIAGNOSIS — M79.675 PAIN OF TOE OF LEFT FOOT: ICD-10-CM

## 2020-09-22 DIAGNOSIS — M20.42 HAMMER TOES OF BOTH FEET: Primary | ICD-10-CM

## 2020-09-22 DIAGNOSIS — M20.41 HAMMER TOES OF BOTH FEET: Primary | ICD-10-CM

## 2020-09-22 DIAGNOSIS — L84 FOOT CALLUS: ICD-10-CM

## 2020-09-22 PROCEDURE — 99203 OFFICE O/P NEW LOW 30 MIN: CPT | Performed by: PODIATRIST

## 2020-09-22 RX ORDER — POLYETHYLENE GLYCOL 3350, SODIUM CHLORIDE, SODIUM BICARBONATE, POTASSIUM CHLORIDE 420; 11.2; 5.72; 1.48 G/4L; G/4L; G/4L; G/4L
POWDER, FOR SOLUTION ORAL
COMMUNITY
Start: 2020-07-21 | End: 2020-09-29

## 2020-09-22 RX ORDER — AMLODIPINE BESYLATE 5 MG/1
1 TABLET ORAL
COMMUNITY
Start: 2020-07-21 | End: 2020-09-29

## 2020-09-22 NOTE — PROGRESS NOTES
Olivia Araujo  1945  75 y.o. female       Patient came to clinic with son for pain in left foot fifth toe     09/22/2020  Chief Complaint   Patient presents with   • Left Foot - Callouses           History of Present Illness    Olivia Araujo is a 75 y.o. female who presents accompanied by her son for evaluation of painful skin lesion to her left fifth toe.  She states is a chronic, intermittent issue.  She feels that this is a corn.  She denies any local redness or drainage.  She describes pain as sharp and stabbing especially in close toed shoe gear.  She denies any recent treatments for this issue.    Past Medical History:   Diagnosis Date   • Acute suppurative otitis media without spontaneous rupture of ear drum     LEFT   • Allergic rhinitis    • Asthma    • Asthma    • Astigmatism    • Cataract    • Colitis    • Cortical senile cataract    • Dry eye    • Dysphagia     POST-OPERATIVE   • Encounter for breast cancer screening other than mammogram    • Essential hypertension     no bp meds now   • Fatigue    • Filamentary keratitis of left eye    • GERD (gastroesophageal reflux disease)    • Hiatal hernia    • High blood pressure    • History of mammography, diagnostic 10/02/2009    MAMMOGRAM BREAST DIAGNOSTIC LT 79367 (Probably benign findings-Recommend continued follow-up at the time of the patient's regular screening mammogram)   • History of mammography, diagnostic 03/31/2009    MAMMOGRAM BREAST DIAGNOSTIC LT 52161 (Probably benign punctate calcifications identified .Short interval follow-up suggested.Follow-up in 6 mos)   • Hyperlipidemia    • Hyponatremia    • Irritable bowel syndrome    • Keratoconjunctivitis sicca (CMS/HCC)    • Labyrinthitis     CHRONIC   • Myopia    • Posterior subcapsular polar senile cataract    • Skin cancer     arm   • Stricture of esophagus    • Vitamin D deficiency          Past Surgical History:   Procedure Laterality Date   • CATARACT EXTRACTION W/ INTRAOCULAR LENS  IMPLANT Right 2/14/2020    Procedure: REMOVE CATARACT AND IMPLANT INTRAOCULAR LENS;  Surgeon: Sylvain Brunner MD;  Location: [facility] OR;  Service: Ophthalmology;  Laterality: Right;   • CERVICAL CONIZATION  04/27/1966    chronic cervictitis,unresponding to cautery and conservative treatment   • COLONOSCOPY  02/16/1998    Colon endoscopy (Internal hemorrhoids.Ischemic colitis has now healed.No other colonic polyps are identified)   • DILATATION AND CURETTAGE  08/28/1978    D&C (punch biopsy of the cervix and electrocautery of the cervix)   • ENDOSCOPY N/A 5/9/2018    Procedure: ESOPHAGOGASTRODUODENOSCOPY;  Surgeon: Ollie Laguna DO;  Location: [facility] ENDOSCOPY;  Service: Gastroenterology   • MAMMO BILATERAL  07/01/2016    SCREENING MAMMOGRAPHY DIGITAL  (Medicare) (Encounter for screening mammogram for malignant neoplasm of breast)    • OTHER SURGICAL HISTORY  02/28/2008    Hysteroscope procedure (Diagnostic hysteroscopy with fractional dilation and curettage and polypectomy.Postmenopausal bleeding)   • OTHER SURGICAL HISTORY  12/30/1997    Hysteroscope procedure (Hysteroscopic polypectomy, fractional D&C.Fluid in endometrium and polypoid tissue per ultrasound)   • PAP SMEAR  03/24/2009    NORMAL   • THORACOTOMY  05/14/2008    Left,repair of diaphragmatic hernia,Jerri gastroplasty,placement of Marcaine infusion system(remainder of the procedure is detailed in 's operative note) large Hiatal hernia GERD,shortened esophagus ( greater than 5cm)   • TOOTH EXTRACTION  04/27/1966    Dental procedure (Removes teeth.Carious and unerupted teeth)   • TUBAL ABDOMINAL LIGATION  05/27/1977    anxiety,marked depression,reaction to birth control         Family History   Problem Relation Age of Onset   • Diabetes Other    • Hypertension Other    • Heart attack Other         REMARKABLE FOR MI   • Breast cancer Mother    • Cancer Mother    • Diabetes Mother    • Thyroid disease Mother    • Breast cancer Sister     • Cancer Sister    • Diabetes Sister    • Cancer Father    • Heart disease Father          Social History     Socioeconomic History   • Marital status:      Spouse name: Not on file   • Number of children: Not on file   • Years of education: Not on file   • Highest education level: Not on file   Tobacco Use   • Smoking status: Never Smoker   • Smokeless tobacco: Never Used   Substance and Sexual Activity   • Alcohol use: No   • Drug use: No   • Sexual activity: Defer         Current Outpatient Medications   Medication Sig Dispense Refill   • albuterol 108 (90 Base) MCG/ACT inhaler Inhale 2 puffs Every 4 (Four) Hours As Needed for Wheezing. 1 inhaler 11   • amLODIPine (NORVASC) 5 MG tablet Take 1 tablet by mouth Daily. For bp 90 tablet 3   • amLODIPine (NORVASC) 5 MG tablet Take 1 tablet by mouth.     • aspirin 325 MG tablet Take 325 mg by mouth daily. 1 tablet every day with lunch Oral     • Cholecalciferol (VITAMIN D-3 PO) Take 2,000 mg by mouth daily.     • esomeprazole (nexIUM) 40 MG capsule Take 40 mg by mouth Every Morning Before Breakfast.     • fexofenadine (ALLEGRA) 60 MG tablet Take 60 mg by mouth Daily.     • fluticasone (FLONASE) 50 MCG/ACT nasal spray INHALE 2 SPRAYS IN THE NOSTRIL(S) AS DIRECTED BY THE PROVIDER DAILY 16 g 11   • Multiple Vitamins-Minerals (GNP HEALTHY EYES SUPERVISION PO) Take  by mouth.     • Polyethyl Glycol-Propyl Glycol (SYSTANE OP) Administer  to both eyes 2 (Two) Times a Day.     • polyethylene glycol-electrolytes (NULYTELY) 420 g solution take by Oral route as directed for BOWEL PREP FOR COLONOSCOPY     • sucralfate (CARAFATE) 1 g tablet Take 1 g by mouth 3 (Three) Times a Day.     • vitamin B-12 (CYANOCOBALAMIN) 1000 MCG tablet Take 1,000 mcg by mouth Daily.       Current Facility-Administered Medications   Medication Dose Route Frequency Provider Last Rate Last Dose   • cyanocobalamin injection 1,000 mcg  1,000 mcg Intramuscular Q28 Days Shaun Billingsley MD   1,000 mcg  "at 09/09/20 1255   • cyanocobalamin injection 1,000 mcg  1,000 mcg Intramuscular Q28 Days Shaun Billingsley MD   1,000 mcg at 08/26/20 1301         OBJECTIVE    Pulse 92   Ht 165.1 cm (65\")   Wt 65.8 kg (145 lb)   SpO2 97%   BMI 24.13 kg/m²       Review of Systems   Constitutional: Negative.    HENT: Negative.    Eyes: Negative.    Respiratory: Positive for wheezing.    Cardiovascular: Negative.    Gastrointestinal: Negative.    Endocrine: Negative.    Genitourinary: Positive for enuresis.   Musculoskeletal: Positive for back pain.   Skin: Negative.    Allergic/Immunologic: Negative.    Neurological: Positive for dizziness.   Hematological: Negative.    Psychiatric/Behavioral: The patient is nervous/anxious.          Physical Exam   Constitutional: she appears well-developed and well-nourished.   HEENT: Normocephalic. Atraumatic  CV: No CP. RRR  Resp: Non-labored respirations  Psychiatric: she has a normal mood and affect. her behavior is normal.         Lower Extremity Exam:  Vascular: DP/PT pulses palpable 2+.   No edema  Foot warm  CFT wnl  Neuro: Protective sensation intact, b/l.  DTRs intact  Integument: No open wounds  Heloma dura to lateral aspect of fifth digit IPJ on left.  Positive severe tenderness palpation.  No erythema, scaling  Skin quality normal  Musculoskeletal: LE muscle strength 5/5.   Gait normal  Ankle ROM decreased without pain or crepitus  STJ ROM full without pain or crepitus  Semirigid hammertoe deformities 2 through 5 bilateral.  Abductovarus rotation of fifth digit on the left        ASSESSMENT AND PLAN    Olivia was seen today for [facility].    Diagnoses and all orders for this visit:    Hammer toes of both feet    Pain of toe of left foot    Foot callus      -Comprehensive foot and ankle exam performed    -Educated pt on diagnosis, etiology and treatment of hammertoe deformity with corn formation  -Debridement of above-noted hyperkeratosis with 15 blade to epidermis on " incident.  -Recommend soft, wide toe box accommodative shoe gear.  -Dispensed toe spacers and toe sleeves.  -Did discuss possible surgical correction in future if symptoms do not improve.  -Recheck as needed.          This document has been electronically signed by Ollie Courtney DPM on September 24, 2020 20:08 CDT     EMR Dragon/Transcription disclaimer:   Much of this encounter note is an electronic transcription/translation of spoken language to printed text. The electronic translation of spoken language may permit erroneous, or at times, nonsensical words or phrases to be inadvertently transcribed; Although I have reviewed the note for such errors, some may still exist.    Ollie Courtney DPM  9/24/2020  20:08 CDT

## 2020-09-29 ENCOUNTER — LAB (OUTPATIENT)
Dept: LAB | Facility: HOSPITAL | Age: 75
End: 2020-09-29

## 2020-09-29 PROCEDURE — U0003 INFECTIOUS AGENT DETECTION BY NUCLEIC ACID (DNA OR RNA); SEVERE ACUTE RESPIRATORY SYNDROME CORONAVIRUS 2 (SARS-COV-2) (CORONAVIRUS DISEASE [COVID-19]), AMPLIFIED PROBE TECHNIQUE, MAKING USE OF HIGH THROUGHPUT TECHNOLOGIES AS DESCRIBED BY CMS-2020-01-R: HCPCS | Performed by: OPHTHALMOLOGY

## 2020-09-29 PROCEDURE — C9803 HOPD COVID-19 SPEC COLLECT: HCPCS | Performed by: OPHTHALMOLOGY

## 2020-09-29 RX ORDER — MECLIZINE HCL 25MG 25 MG/1
25 TABLET, CHEWABLE ORAL 3 TIMES DAILY PRN
COMMUNITY
End: 2022-04-14

## 2020-09-30 LAB
COVID LABCORP PRIORITY: NORMAL
SARS-COV-2 RNA RESP QL NAA+PROBE: NOT DETECTED

## 2020-10-01 ENCOUNTER — TELEPHONE (OUTPATIENT)
Dept: FAMILY MEDICINE CLINIC | Facility: CLINIC | Age: 75
End: 2020-10-01

## 2020-10-02 ENCOUNTER — ANESTHESIA (OUTPATIENT)
Dept: PERIOP | Facility: HOSPITAL | Age: 75
End: 2020-10-02

## 2020-10-02 ENCOUNTER — ANESTHESIA EVENT (OUTPATIENT)
Dept: PERIOP | Facility: HOSPITAL | Age: 75
End: 2020-10-02

## 2020-10-02 ENCOUNTER — HOSPITAL ENCOUNTER (OUTPATIENT)
Facility: HOSPITAL | Age: 75
Setting detail: HOSPITAL OUTPATIENT SURGERY
Discharge: HOME | End: 2020-10-02
Attending: OPHTHALMOLOGY | Admitting: OPHTHALMOLOGY

## 2020-10-02 VITALS
RESPIRATION RATE: 16 BRPM | WEIGHT: 149.03 LBS | TEMPERATURE: 97.5 F | HEIGHT: 65 IN | HEART RATE: 72 BPM | OXYGEN SATURATION: 98 % | DIASTOLIC BLOOD PRESSURE: 75 MMHG | BODY MASS INDEX: 24.83 KG/M2 | SYSTOLIC BLOOD PRESSURE: 133 MMHG

## 2020-10-02 PROCEDURE — 25010000002 MIDAZOLAM PER 1 MG: Performed by: NURSE ANESTHETIST, CERTIFIED REGISTERED

## 2020-10-02 PROCEDURE — 25010000002 FENTANYL CITRATE (PF) 100 MCG/2ML SOLUTION: Performed by: NURSE ANESTHETIST, CERTIFIED REGISTERED

## 2020-10-02 PROCEDURE — 25010000002 VANCOMYCIN 1 G RECONSTITUTED SOLUTION 1 EACH VIAL: Performed by: OPHTHALMOLOGY

## 2020-10-02 PROCEDURE — V2632 POST CHMBR INTRAOCULAR LENS: HCPCS | Performed by: OPHTHALMOLOGY

## 2020-10-02 PROCEDURE — 25010000002 EPINEPHRINE PER 0.1 MG: Performed by: OPHTHALMOLOGY

## 2020-10-02 DEVICE — LENS ACRYSOF IQ 6X13MM SN60WF 20.5: Type: IMPLANTABLE DEVICE | Site: EYE | Status: FUNCTIONAL

## 2020-10-02 RX ORDER — TETRACAINE HYDROCHLORIDE 5 MG/ML
SOLUTION OPHTHALMIC AS NEEDED
Status: DISCONTINUED | OUTPATIENT
Start: 2020-10-02 | End: 2020-10-02 | Stop reason: HOSPADM

## 2020-10-02 RX ORDER — PHENYLEPHRINE HCL 2.5 %
1 DROPS OPHTHALMIC (EYE)
Status: COMPLETED | OUTPATIENT
Start: 2020-10-02 | End: 2020-10-02

## 2020-10-02 RX ORDER — MOXIFLOXACIN 5 MG/ML
SOLUTION/ DROPS OPHTHALMIC AS NEEDED
Status: DISCONTINUED | OUTPATIENT
Start: 2020-10-02 | End: 2020-10-02 | Stop reason: HOSPADM

## 2020-10-02 RX ORDER — TETRACAINE HYDROCHLORIDE 5 MG/ML
1 SOLUTION OPHTHALMIC AS NEEDED
Status: COMPLETED | OUTPATIENT
Start: 2020-10-02 | End: 2020-10-02

## 2020-10-02 RX ORDER — FENTANYL CITRATE 50 UG/ML
INJECTION, SOLUTION INTRAMUSCULAR; INTRAVENOUS AS NEEDED
Status: DISCONTINUED | OUTPATIENT
Start: 2020-10-02 | End: 2020-10-02 | Stop reason: SURG

## 2020-10-02 RX ORDER — PREDNISOLONE ACETATE 10 MG/ML
SUSPENSION/ DROPS OPHTHALMIC AS NEEDED
Status: DISCONTINUED | OUTPATIENT
Start: 2020-10-02 | End: 2020-10-02 | Stop reason: HOSPADM

## 2020-10-02 RX ORDER — ONDANSETRON 2 MG/ML
4 INJECTION INTRAMUSCULAR; INTRAVENOUS ONCE AS NEEDED
Status: DISCONTINUED | OUTPATIENT
Start: 2020-10-02 | End: 2020-10-02 | Stop reason: HOSPADM

## 2020-10-02 RX ORDER — MIDAZOLAM HYDROCHLORIDE 1 MG/ML
INJECTION INTRAMUSCULAR; INTRAVENOUS AS NEEDED
Status: DISCONTINUED | OUTPATIENT
Start: 2020-10-02 | End: 2020-10-02 | Stop reason: SURG

## 2020-10-02 RX ORDER — CYCLOPENTOLATE HYDROCHLORIDE 20 MG/ML
1 SOLUTION/ DROPS OPHTHALMIC
Status: COMPLETED | OUTPATIENT
Start: 2020-10-02 | End: 2020-10-02

## 2020-10-02 RX ORDER — BRIMONIDINE TARTRATE 0.15 %
DROPS OPHTHALMIC (EYE) AS NEEDED
Status: DISCONTINUED | OUTPATIENT
Start: 2020-10-02 | End: 2020-10-02 | Stop reason: HOSPADM

## 2020-10-02 RX ORDER — SODIUM CHLORIDE 0.9 % (FLUSH) 0.9 %
10 SYRINGE (ML) INJECTION AS NEEDED
Status: DISCONTINUED | OUTPATIENT
Start: 2020-10-02 | End: 2020-10-02 | Stop reason: HOSPADM

## 2020-10-02 RX ADMIN — PHENYLEPHRINE HYDROCHLORIDE 1 DROP: 25 SOLUTION/ DROPS OPHTHALMIC at 06:55

## 2020-10-02 RX ADMIN — SODIUM CHLORIDE, PRESERVATIVE FREE 5 ML: 5 INJECTION INTRAVENOUS at 08:41

## 2020-10-02 RX ADMIN — MIDAZOLAM HYDROCHLORIDE 0.5 MG: 2 INJECTION, SOLUTION INTRAMUSCULAR; INTRAVENOUS at 08:41

## 2020-10-02 RX ADMIN — CYCLOPENTOLATE HYDROCHLORIDE 1 DROP: 20 SOLUTION/ DROPS OPHTHALMIC at 07:04

## 2020-10-02 RX ADMIN — FENTANYL CITRATE 50 MCG: 50 INJECTION, SOLUTION INTRAMUSCULAR; INTRAVENOUS at 08:41

## 2020-10-02 RX ADMIN — SODIUM CHLORIDE, PRESERVATIVE FREE 10 ML: 5 INJECTION INTRAVENOUS at 06:53

## 2020-10-02 RX ADMIN — CYCLOPENTOLATE HYDROCHLORIDE 1 DROP: 20 SOLUTION/ DROPS OPHTHALMIC at 06:45

## 2020-10-02 RX ADMIN — TETRACAINE HYDROCHLORIDE 1 DROP: 5 SOLUTION OPHTHALMIC at 06:45

## 2020-10-02 RX ADMIN — TETRACAINE HYDROCHLORIDE 1 DROP: 5 SOLUTION OPHTHALMIC at 07:04

## 2020-10-02 RX ADMIN — PHENYLEPHRINE HYDROCHLORIDE 1 DROP: 25 SOLUTION/ DROPS OPHTHALMIC at 06:45

## 2020-10-02 RX ADMIN — PHENYLEPHRINE HYDROCHLORIDE 1 DROP: 25 SOLUTION/ DROPS OPHTHALMIC at 07:04

## 2020-10-02 RX ADMIN — CYCLOPENTOLATE HYDROCHLORIDE 1 DROP: 20 SOLUTION/ DROPS OPHTHALMIC at 06:55

## 2020-10-02 NOTE — ANESTHESIA POSTPROCEDURE EVALUATION
Patient: Olivia Araujo    Procedure Summary     Date: 10/02/20 Room / Location: [facility] OR 06 / [facility] OR    Anesthesia Start: 0841 Anesthesia Stop:     Procedure: REMOVE CATARACT AND IMPLANT INTRAOCULAR LENS (Left Eye) Diagnosis:       Age-related nuclear cataract of left eye      (age-related nuclear cataract of left eye)    Surgeon: Sylvain Brunner MD Provider: Óscar Martin MD    Anesthesia Type: MAC ASA Status: 3          Anesthesia Type: MAC    Vitals  No vitals data found for the desired time range.          Post Anesthesia Care and Evaluation    Patient location during evaluation: PHASE II  Patient participation: complete - patient participated  Level of consciousness: awake  Pain score: 0  Pain management: adequate  Airway patency: patent  Anesthetic complications: No anesthetic complications  PONV Status: none  Cardiovascular status: acceptable  Respiratory status: acceptable  Hydration status: acceptable

## 2020-10-02 NOTE — ANESTHESIA PREPROCEDURE EVALUATION
Anesthesia Evaluation     Patient summary reviewed and Nursing notes reviewed   no history of anesthetic complications:  NPO Solid Status: > 8 hours  NPO Liquid Status: > 8 hours           Airway   Mallampati: II  TM distance: >3 FB  Neck ROM: full  possible difficult intubation  Dental    (+) upper dentures    Pulmonary - normal exam    breath sounds clear to auscultation  (+) asthma,  (-) not a smoker  Cardiovascular - normal exam    Rhythm: regular  Rate: normal    (+) hypertension, hyperlipidemia,   (-) murmur, carotid bruits      Neuro/Psych  (+) psychiatric history Anxiety,     GI/Hepatic/Renal/Endo    (+)  hiatal hernia, GERD well controlled,      Musculoskeletal     Abdominal    Substance History - negative use     OB/GYN negative ob/gyn ROS         Other   arthritis,                        Anesthesia Plan    ASA 3     MAC     intravenous induction     Anesthetic plan, all risks, benefits, and alternatives have been provided, discussed and informed consent has been obtained with: patient and sibling.

## 2020-10-09 ENCOUNTER — OFFICE VISIT (OUTPATIENT)
Dept: FAMILY MEDICINE CLINIC | Facility: CLINIC | Age: 75
End: 2020-10-09

## 2020-10-09 ENCOUNTER — LAB (OUTPATIENT)
Dept: LAB | Facility: HOSPITAL | Age: 75
End: 2020-10-09

## 2020-10-09 VITALS
WEIGHT: 151.1 LBS | SYSTOLIC BLOOD PRESSURE: 122 MMHG | HEIGHT: 65 IN | BODY MASS INDEX: 25.18 KG/M2 | DIASTOLIC BLOOD PRESSURE: 72 MMHG

## 2020-10-09 DIAGNOSIS — F41.1 GAD (GENERALIZED ANXIETY DISORDER): Chronic | ICD-10-CM

## 2020-10-09 DIAGNOSIS — E53.8 B12 DEFICIENCY: Primary | Chronic | ICD-10-CM

## 2020-10-09 DIAGNOSIS — Z11.59 ENCOUNTER FOR HEPATITIS C SCREENING TEST FOR LOW RISK PATIENT: ICD-10-CM

## 2020-10-09 DIAGNOSIS — M72.0 DUPUYTREN'S CONTRACTURE: ICD-10-CM

## 2020-10-09 DIAGNOSIS — Z23 NEED FOR IMMUNIZATION AGAINST INFLUENZA: ICD-10-CM

## 2020-10-09 DIAGNOSIS — E55.9 VITAMIN D DEFICIENCY: Chronic | ICD-10-CM

## 2020-10-09 DIAGNOSIS — I10 ESSENTIAL HYPERTENSION: Chronic | ICD-10-CM

## 2020-10-09 LAB
HCT VFR BLD AUTO: 37.1 % (ref 34–46.6)
HCV AB SER DONR QL: NORMAL
HGB BLD-MCNC: 12.9 G/DL (ref 12–15.9)
VIT B12 BLD-MCNC: >2000 PG/ML (ref 211–946)

## 2020-10-09 PROCEDURE — G0008 ADMIN INFLUENZA VIRUS VAC: HCPCS | Performed by: FAMILY MEDICINE

## 2020-10-09 PROCEDURE — 90653 IIV ADJUVANT VACCINE IM: CPT | Performed by: FAMILY MEDICINE

## 2020-10-09 PROCEDURE — 96372 THER/PROPH/DIAG INJ SC/IM: CPT | Performed by: FAMILY MEDICINE

## 2020-10-09 PROCEDURE — 36415 COLL VENOUS BLD VENIPUNCTURE: CPT | Performed by: FAMILY MEDICINE

## 2020-10-09 PROCEDURE — 85018 HEMOGLOBIN: CPT | Performed by: FAMILY MEDICINE

## 2020-10-09 PROCEDURE — 99214 OFFICE O/P EST MOD 30 MIN: CPT | Performed by: FAMILY MEDICINE

## 2020-10-09 PROCEDURE — 85014 HEMATOCRIT: CPT | Performed by: FAMILY MEDICINE

## 2020-10-09 PROCEDURE — 86803 HEPATITIS C AB TEST: CPT | Performed by: FAMILY MEDICINE

## 2020-10-09 PROCEDURE — 82607 VITAMIN B-12: CPT | Performed by: FAMILY MEDICINE

## 2020-10-09 RX ORDER — CYANOCOBALAMIN 1000 UG/ML
1000 INJECTION, SOLUTION INTRAMUSCULAR; SUBCUTANEOUS
Status: DISCONTINUED | OUTPATIENT
Start: 2020-10-09 | End: 2021-10-08

## 2020-10-09 RX ADMIN — CYANOCOBALAMIN 1000 MCG: 1000 INJECTION, SOLUTION INTRAMUSCULAR; SUBCUTANEOUS at 13:26

## 2020-10-18 ENCOUNTER — LAB (OUTPATIENT)
Dept: LAB | Facility: HOSPITAL | Age: 75
End: 2020-10-18

## 2020-10-18 DIAGNOSIS — Z01.818 PREOP TESTING: Primary | ICD-10-CM

## 2020-10-18 LAB — SARS-COV-2 N GENE RESP QL NAA+PROBE: NOT DETECTED

## 2020-10-18 PROCEDURE — C9803 HOPD COVID-19 SPEC COLLECT: HCPCS

## 2020-10-18 PROCEDURE — 87635 SARS-COV-2 COVID-19 AMP PRB: CPT

## 2020-10-20 RX ORDER — FLUTICASONE PROPIONATE 50 MCG
2 SPRAY, SUSPENSION (ML) NASAL DAILY PRN
COMMUNITY
End: 2021-07-06

## 2020-10-21 ENCOUNTER — ANESTHESIA EVENT (OUTPATIENT)
Dept: GASTROENTEROLOGY | Facility: HOSPITAL | Age: 75
End: 2020-10-21

## 2020-10-21 ENCOUNTER — HOSPITAL ENCOUNTER (OUTPATIENT)
Facility: HOSPITAL | Age: 75
Setting detail: HOSPITAL OUTPATIENT SURGERY
Discharge: HOME | End: 2020-10-21
Attending: INTERNAL MEDICINE | Admitting: INTERNAL MEDICINE

## 2020-10-21 ENCOUNTER — ANESTHESIA (OUTPATIENT)
Dept: GASTROENTEROLOGY | Facility: HOSPITAL | Age: 75
End: 2020-10-21

## 2020-10-21 VITALS
WEIGHT: 146.61 LBS | TEMPERATURE: 97.3 F | OXYGEN SATURATION: 95 % | HEART RATE: 80 BPM | DIASTOLIC BLOOD PRESSURE: 72 MMHG | SYSTOLIC BLOOD PRESSURE: 122 MMHG | BODY MASS INDEX: 24.43 KG/M2 | HEIGHT: 65 IN | RESPIRATION RATE: 18 BRPM

## 2020-10-21 DIAGNOSIS — Z12.11 SCREEN FOR COLON CANCER: ICD-10-CM

## 2020-10-21 PROCEDURE — 88305 TISSUE EXAM BY PATHOLOGIST: CPT

## 2020-10-21 PROCEDURE — 25010000002 PROPOFOL 10 MG/ML EMULSION: Performed by: NURSE ANESTHETIST, CERTIFIED REGISTERED

## 2020-10-21 RX ORDER — LIDOCAINE HYDROCHLORIDE 20 MG/ML
INJECTION, SOLUTION INTRAVENOUS AS NEEDED
Status: DISCONTINUED | OUTPATIENT
Start: 2020-10-21 | End: 2020-10-21 | Stop reason: SURG

## 2020-10-21 RX ORDER — PROPOFOL 10 MG/ML
VIAL (ML) INTRAVENOUS AS NEEDED
Status: DISCONTINUED | OUTPATIENT
Start: 2020-10-21 | End: 2020-10-21 | Stop reason: SURG

## 2020-10-21 RX ORDER — DEXTROSE AND SODIUM CHLORIDE 5; .45 G/100ML; G/100ML
30 INJECTION, SOLUTION INTRAVENOUS CONTINUOUS PRN
Status: DISCONTINUED | OUTPATIENT
Start: 2020-10-21 | End: 2020-10-21 | Stop reason: HOSPADM

## 2020-10-21 RX ADMIN — PROPOFOL 20 MG: 10 INJECTION, EMULSION INTRAVENOUS at 13:42

## 2020-10-21 RX ADMIN — LIDOCAINE HYDROCHLORIDE 80 MG: 20 INJECTION, SOLUTION INTRAVENOUS at 13:38

## 2020-10-21 RX ADMIN — PROPOFOL 80 MG: 10 INJECTION, EMULSION INTRAVENOUS at 13:38

## 2020-10-21 RX ADMIN — PROPOFOL 20 MG: 10 INJECTION, EMULSION INTRAVENOUS at 13:46

## 2020-10-21 RX ADMIN — DEXTROSE AND SODIUM CHLORIDE 30 ML/HR: 5; 450 INJECTION, SOLUTION INTRAVENOUS at 12:56

## 2020-10-21 RX ADMIN — PROPOFOL 20 MG: 10 INJECTION, EMULSION INTRAVENOUS at 13:40

## 2020-10-21 NOTE — ANESTHESIA POSTPROCEDURE EVALUATION
Patient: Olivia Araujo    Procedure Summary     Date: 10/21/20 Room / Location: [facility] ENDOSCOPY 2 / [facility] ENDOSCOPY    Anesthesia Start: 1337 Anesthesia Stop: 1353    Procedure: COLONOSCOPY (N/A ) Diagnosis:       Screen for colon cancer      (Screen for colon cancer [Z12.11])    Surgeon: Ollie Laguna DO Provider: Nai Lemos CRNA    Anesthesia Type: MAC ASA Status: 3          Anesthesia Type: MAC    Vitals  No vitals data found for the desired time range.          Post Anesthesia Care and Evaluation    Patient location during evaluation: bedside  Patient participation: waiting for patient participation  Level of consciousness: sleepy but conscious  Pain score: 0  Pain management: adequate  Airway patency: patent  Anesthetic complications: No anesthetic complications  PONV Status: none  Cardiovascular status: acceptable  Respiratory status: acceptable  Hydration status: acceptable

## 2020-10-21 NOTE — ANESTHESIA PREPROCEDURE EVALUATION
Anesthesia Evaluation     Patient summary reviewed and Nursing notes reviewed   NPO Solid Status: > 8 hours  NPO Liquid Status: > 6 hours           Airway   Mallampati: II  TM distance: >3 FB  Neck ROM: full  No difficulty expected  Dental    (+) upper dentures    Pulmonary - normal exam   (+) asthma,  Cardiovascular - normal exam    (+) hypertension, hyperlipidemia,       Neuro/Psych  (+) psychiatric history Anxiety and Depression,     GI/Hepatic/Renal/Endo    (+)  hiatal hernia, GERD,      Musculoskeletal     Abdominal  - normal exam   Substance History - negative use     OB/GYN negative ob/gyn ROS         Other   arthritis,    history of cancer remission                  Anesthesia Plan    ASA 3     MAC     intravenous induction     Anesthetic plan, all risks, benefits, and alternatives have been provided, discussed and informed consent has been obtained with: patient.

## 2020-10-21 NOTE — H&P
Brooke Conner DO,[facility]  Gastroenterology  Hepatology  Endoscopy  Board Certified in Internal Medicine and gastroenterology  [address], [address]  [place]. [address]  - [phone]   F - [phone]     GASTROENTEROLOGY HISTORY AND PHYSICAL  NOTE   BROOKE CONNER DO.         SUBJECTIVE:   10/21/2020    Name: Olivia Araujo  DOD: 1945        Chief Complaint:       Subjective : Screen for colon cancer.    Patient is 75 y.o. female presents with desire for screening colonoscopy.      ROS/HISTORY/ CURRENT MEDICATIONS/OBJECTIVE/VS/PE:   Review of Systems:  All systems unremarkable unless specified below.  Constitutional   HENT  Eyes   Respiratory    Cardiovascular  Gastrointestinal   Endocrine  Genitourinary    Musculoskeletal   Skin  Allergic/Immunologic    Neurological    Hematological  Psychiatric/Behavioral    History:     Past Medical History:   Diagnosis Date   • Acute suppurative otitis media without spontaneous rupture of ear drum     LEFT   • Allergic rhinitis    • Anxiety    • Arthritis    • Asthma    • Astigmatism    • Bladder disorder     leaks   • Cataract    • Colitis    • Cortical senile cataract    • Dry eye    • Dysphagia     POST-OPERATIVE   • Encounter for breast cancer screening other than mammogram    • Essential hypertension     no bp meds now   • Fatigue    • Filamentary keratitis of left eye    • GERD (gastroesophageal reflux disease)    • Hiatal hernia    • High blood pressure    • History of mammography, diagnostic 10/02/2009    MAMMOGRAM BREAST DIAGNOSTIC LT 10685 (Probably benign findings-Recommend continued follow-up at the time of the patient's regular screening mammogram)   • History of mammography, diagnostic 03/31/2009    MAMMOGRAM BREAST DIAGNOSTIC LT 37730 (Probably benign punctate calcifications identified .Short interval follow-up suggested.Follow-up in 6 mos)   • Hyperlipidemia    • Hyponatremia    • Irritable bowel syndrome    • Keratoconjunctivitis  sicca (CMS/HCC)    • Labyrinthitis     CHRONIC   • Myopia    • Posterior subcapsular polar senile cataract    • Skin cancer     arm   • Stricture of esophagus    • Vitamin D deficiency    • Wears dentures     wears upper all the time   • Wears glasses     reading only     Past Surgical History:   Procedure Laterality Date   • CATARACT EXTRACTION W/ INTRAOCULAR LENS IMPLANT Right 2/14/2020    Procedure: REMOVE CATARACT AND IMPLANT INTRAOCULAR LENS;  Surgeon: Sylvain Brunner MD;  Location: [facility] OR;  Service: Ophthalmology;  Laterality: Right;   • CATARACT EXTRACTION W/ INTRAOCULAR LENS IMPLANT Left 10/2/2020    Procedure: REMOVE CATARACT AND IMPLANT INTRAOCULAR LENS;  Surgeon: Sylvain Brunner MD;  Location: [facility] OR;  Service: Ophthalmology;  Laterality: Left;   • CERVICAL CONIZATION  04/27/1966    chronic cervictitis,unresponding to cautery and conservative treatment   • COLONOSCOPY  02/16/1998    Colon endoscopy (Internal hemorrhoids.Ischemic colitis has now healed.No other colonic polyps are identified)   • DILATATION AND CURETTAGE  08/28/1978    D&C (punch biopsy of the cervix and electrocautery of the cervix)   • ENDOSCOPY N/A 5/9/2018    Procedure: ESOPHAGOGASTRODUODENOSCOPY;  Surgeon: Ollie Laguna DO;  Location: [facility] ENDOSCOPY;  Service: Gastroenterology   • MAMMO BILATERAL  07/01/2016    SCREENING MAMMOGRAPHY DIGITAL  (Medicare) (Encounter for screening mammogram for malignant neoplasm of breast)    • OTHER SURGICAL HISTORY  02/28/2008    Hysteroscope procedure (Diagnostic hysteroscopy with fractional dilation and curettage and polypectomy.Postmenopausal bleeding)   • OTHER SURGICAL HISTORY  12/30/1997    Hysteroscope procedure (Hysteroscopic polypectomy, fractional D&C.Fluid in endometrium and polypoid tissue per ultrasound)   • PAP SMEAR  03/24/2009    NORMAL   • THORACOTOMY  05/14/2008    Left,repair of diaphragmatic hernia,Jerri gastroplasty,placement of Marcaine infusion  system(remainder of the procedure is detailed in 's operative note) large Hiatal hernia GERD,shortened esophagus ( greater than 5cm)   • TOOTH EXTRACTION  04/27/1966    Dental procedure (Removes teeth.Carious and unerupted teeth)   • TUBAL ABDOMINAL LIGATION  05/27/1977    anxiety,marked depression,reaction to birth control     Family History   Problem Relation Age of Onset   • Diabetes Other    • Hypertension Other    • Heart attack Other         REMARKABLE FOR MI   • Breast cancer Mother    • Cancer Mother    • Diabetes Mother    • Thyroid disease Mother    • Breast cancer Sister    • Cancer Sister    • Diabetes Sister    • Cancer Father    • Heart disease Father      Social History     Tobacco Use   • Smoking status: Never Smoker   • Smokeless tobacco: Never Used   Substance Use Topics   • Alcohol use: No   • Drug use: No     Prior to Admission medications    Medication Sig Start Date End Date Taking? Authorizing Provider   albuterol 108 (90 Base) MCG/ACT inhaler Inhale 2 puffs Every 4 (Four) Hours As Needed for Wheezing. 12/6/18  Yes Shaun Billingsley MD   amLODIPine (NORVASC) 5 MG tablet Take 1 tablet by mouth Daily. For bp 8/26/20  Yes Shaun Billingsley MD   Cholecalciferol (VITAMIN D-3 PO) Take 2,000 mg by mouth daily. 8/11/16  Yes Karina Kong MD   esomeprazole (nexIUM) 40 MG capsule Take 40 mg by mouth Every Morning Before Breakfast.   Yes Karina Kong MD   fexofenadine (ALLEGRA) 180 MG tablet Take 180 mg by mouth Every Night.   Yes Karina Kong MD   fluticasone (FLONASE) 50 MCG/ACT nasal spray 2 sprays into the nostril(s) as directed by provider Daily As Needed for Rhinitis.   Yes Karina Kong MD   meclizine 25 MG chewable tablet chewable tablet Chew 25 mg 3 (Three) Times a Day As Needed.   Yes Karina Kong MD   Multiple Vitamins-Minerals (GNP HEALTHY EYES SUPERVISION PO) Take  by mouth 2 (Two) Times a Day.   Yes Karina Kong MD   Pediatric  Multivitamins-Iron (FLINTSTONES COMPLETE PO) Take 1 tablet by mouth Daily.   Yes Karina Kong MD   Polyvinyl Alcohol-Povidone (REFRESH OP) Apply  to eye(s) as directed by provider As Needed.   Yes Karina Kong MD   sucralfate (CARAFATE) 1 g tablet Take 1 g by mouth 3 (Three) Times a Day.   Yes Karina Kong MD   aspirin 325 MG tablet Take 325 mg by mouth daily. 1 tablet every day with lunch Oral 8/11/16   Karina Kong MD     Allergies:  Hydrocodone-guaifenesin, Other, Clarithromycin, Erythromycin, Latex, Neomycin, and Sulfa antibiotics    I have reviewed the patients medical history, surgical history and family history in the available medical record system.     Current Medications:     Current Facility-Administered Medications   Medication Dose Route Frequency Provider Last Rate Last Dose   • dextrose 5 % and sodium chloride 0.45 % infusion  30 mL/hr Intravenous Continuous PRN Ollie Laguna DO 30 mL/hr at 10/21/20 1256 30 mL/hr at 10/21/20 1256       Objective     Physical Exam:   Temp:  [98.1 °F (36.7 °C)] 98.1 °F (36.7 °C)  Heart Rate:  [85] 85  Resp:  [16] 16  BP: (138)/(70) 138/70    Physical Exam:  General Appearance:    Alert, cooperative, in no acute distress   Head:    Normocephalic, without obvious abnormality, atraumatic   Eyes:            Lids and lashes normal, conjunctivae and sclerae normal, no   icterus, no pallor, corneas clear, PERRLA   Ears:    Ears appear intact with no abnormalities noted   Throat:   No oral lesions, no thrush, oral mucosa moist   Neck:   No adenopathy, supple, trachea midline, no thyromegaly, no     carotid bruit, no JVD   Back:     No kyphosis present, no scoliosis present, no skin lesions,       erythema or scars, no tenderness to percussion or                   palpation,   range of motion normal   Lungs:     Clear to auscultation,respirations regular, even and                   unlabored    Heart:    Regular rhythm and normal rate,  normal S1 and S2, no            murmur, no gallop, no rub, no click   Breast Exam:    Deferred   Abdomen:     Normal bowel sounds, no masses, no organomegaly, soft        non-tender, non-distended, no guarding, no rebound                 tenderness   Genitalia:    Deferred   Extremities:   Moves all extremities well, no edema, no cyanosis, no              redness   Pulses:   Pulses palpable and equal bilaterally   Skin:   No bleeding, bruising or rash   Lymph nodes:   No palpable adenopathy   Neurologic:   Cranial nerves 2 - 12 grossly intact, sensation intact, DTR        present and equal bilaterally      Results Review:     Lab Results   Component Value Date    WBC 7.02 05/14/2020    WBC 7.9 10/01/2019    WBC 6.6 06/28/2016    HGB 12.9 10/09/2020    HGB 13.4 05/14/2020    HGB 12.8 10/01/2019    HCT 37.1 10/09/2020    HCT 38.8 05/14/2020    HCT 36.9 10/01/2019     05/14/2020     10/01/2019     06/28/2016             No results found for: LIPASE  No results found for: INR  No results found for: CULTURE    Radiology Review:  Imaging Results (Last 72 Hours)     ** No results found for the last 72 hours. **           I reviewed the patient's new clinical results.  I reviewed the patient's new imaging results and agree with the interpretation.     ASSESSMENT/PLAN:   ASSESSMENT:  1.  Screening examination routine risk for colon cancer    PLAN:  1.  Colonoscopy    Risk and benefits associated with the procedure are reviewed with the patient.  The patient wished to proceed     Ollie Laguna DO  10/21/20  13:18 CDT

## 2020-10-26 LAB
LAB AP CASE REPORT: NORMAL
PATH REPORT.FINAL DX SPEC: NORMAL

## 2020-11-02 RX ORDER — ALBUTEROL SULFATE 90 UG/1
AEROSOL, METERED RESPIRATORY (INHALATION)
Qty: 18 G | Refills: 0 | Status: SHIPPED | OUTPATIENT
Start: 2020-11-02 | End: 2021-04-23 | Stop reason: SDUPTHER

## 2020-11-05 ENCOUNTER — CLINICAL SUPPORT (OUTPATIENT)
Dept: FAMILY MEDICINE CLINIC | Facility: CLINIC | Age: 75
End: 2020-11-05

## 2020-11-05 PROCEDURE — 96372 THER/PROPH/DIAG INJ SC/IM: CPT | Performed by: FAMILY MEDICINE

## 2020-11-05 RX ADMIN — CYANOCOBALAMIN 1000 MCG: 1000 INJECTION, SOLUTION INTRAMUSCULAR; SUBCUTANEOUS at 13:07

## 2020-12-09 ENCOUNTER — TELEPHONE (OUTPATIENT)
Dept: FAMILY MEDICINE CLINIC | Facility: CLINIC | Age: 75
End: 2020-12-09

## 2020-12-09 NOTE — TELEPHONE ENCOUNTER
Caller: PATIENT       Best call back number: [phone]    Patient is needing: PATIENT UNDERSTOOD SHE NEEDED TO FOLLOW UP WITH  IN MARCH, I COULDN'T FIND A MARCH APPOINTMENT--SHE HAS AN APPOINTMENT SCHEDULED 05/08/2021. PLEASE ADVISE AND CALL PATIENT. THANK YOU.

## 2021-01-21 ENCOUNTER — IMMUNIZATION (OUTPATIENT)
Dept: VACCINE CLINIC | Facility: HOSPITAL | Age: 76
End: 2021-01-21

## 2021-01-21 PROCEDURE — 91300 HC SARSCOV02 VAC 30MCG/0.3ML IM: CPT | Performed by: THORACIC SURGERY (CARDIOTHORACIC VASCULAR SURGERY)

## 2021-01-21 PROCEDURE — 0001A: CPT | Performed by: THORACIC SURGERY (CARDIOTHORACIC VASCULAR SURGERY)

## 2021-02-11 ENCOUNTER — APPOINTMENT (OUTPATIENT)
Dept: VACCINE CLINIC | Facility: HOSPITAL | Age: 76
End: 2021-02-11

## 2021-02-19 ENCOUNTER — APPOINTMENT (OUTPATIENT)
Dept: VACCINE CLINIC | Facility: HOSPITAL | Age: 76
End: 2021-02-19

## 2021-02-24 ENCOUNTER — IMMUNIZATION (OUTPATIENT)
Dept: VACCINE CLINIC | Facility: HOSPITAL | Age: 76
End: 2021-02-24

## 2021-02-24 PROCEDURE — 91300 HC SARSCOV02 VAC 30MCG/0.3ML IM: CPT | Performed by: THORACIC SURGERY (CARDIOTHORACIC VASCULAR SURGERY)

## 2021-02-24 PROCEDURE — 0002A: CPT | Performed by: THORACIC SURGERY (CARDIOTHORACIC VASCULAR SURGERY)

## 2021-04-08 ENCOUNTER — OFFICE VISIT (OUTPATIENT)
Dept: FAMILY MEDICINE CLINIC | Facility: CLINIC | Age: 76
End: 2021-04-08

## 2021-04-08 ENCOUNTER — LAB (OUTPATIENT)
Dept: LAB | Facility: HOSPITAL | Age: 76
End: 2021-04-08

## 2021-04-08 VITALS
HEIGHT: 65 IN | DIASTOLIC BLOOD PRESSURE: 74 MMHG | WEIGHT: 152 LBS | SYSTOLIC BLOOD PRESSURE: 128 MMHG | BODY MASS INDEX: 25.33 KG/M2

## 2021-04-08 DIAGNOSIS — Z00.00 MEDICARE ANNUAL WELLNESS VISIT, SUBSEQUENT: ICD-10-CM

## 2021-04-08 DIAGNOSIS — I10 ESSENTIAL HYPERTENSION: Primary | Chronic | ICD-10-CM

## 2021-04-08 DIAGNOSIS — E53.8 B12 DEFICIENCY: Chronic | ICD-10-CM

## 2021-04-08 DIAGNOSIS — F41.1 GAD (GENERALIZED ANXIETY DISORDER): Chronic | ICD-10-CM

## 2021-04-08 LAB
ALBUMIN SERPL-MCNC: 4.4 G/DL (ref 3.5–5.2)
ALBUMIN/GLOB SERPL: 1.6 G/DL
ALP SERPL-CCNC: 110 U/L (ref 39–117)
ALT SERPL W P-5'-P-CCNC: 12 U/L (ref 1–33)
ANION GAP SERPL CALCULATED.3IONS-SCNC: 9.7 MMOL/L (ref 5–15)
AST SERPL-CCNC: 18 U/L (ref 1–32)
BILIRUB SERPL-MCNC: 0.4 MG/DL (ref 0–1.2)
BUN SERPL-MCNC: 11 MG/DL (ref 8–23)
BUN/CREAT SERPL: 10.9 (ref 7–25)
CALCIUM SPEC-SCNC: 9.2 MG/DL (ref 8.6–10.5)
CHLORIDE SERPL-SCNC: 101 MMOL/L (ref 98–107)
CO2 SERPL-SCNC: 28.3 MMOL/L (ref 22–29)
CREAT SERPL-MCNC: 1.01 MG/DL (ref 0.57–1)
DEPRECATED RDW RBC AUTO: 44 FL (ref 37–54)
ERYTHROCYTE [DISTWIDTH] IN BLOOD BY AUTOMATED COUNT: 13 % (ref 12.3–15.4)
GFR SERPL CREATININE-BSD FRML MDRD: 53 ML/MIN/1.73
GLOBULIN UR ELPH-MCNC: 2.8 GM/DL
GLUCOSE SERPL-MCNC: 97 MG/DL (ref 65–99)
HCT VFR BLD AUTO: 38.9 % (ref 34–46.6)
HGB BLD-MCNC: 13.5 G/DL (ref 12–15.9)
MAGNESIUM SERPL-MCNC: 2.3 MG/DL (ref 1.6–2.4)
MCH RBC QN AUTO: 32.7 PG (ref 26.6–33)
MCHC RBC AUTO-ENTMCNC: 34.7 G/DL (ref 31.5–35.7)
MCV RBC AUTO: 94.2 FL (ref 79–97)
PLATELET # BLD AUTO: 288 10*3/MM3 (ref 140–450)
PMV BLD AUTO: 10.4 FL (ref 6–12)
POTASSIUM SERPL-SCNC: 4.8 MMOL/L (ref 3.5–5.2)
PROT SERPL-MCNC: 7.2 G/DL (ref 6–8.5)
RBC # BLD AUTO: 4.13 10*6/MM3 (ref 3.77–5.28)
SODIUM SERPL-SCNC: 139 MMOL/L (ref 136–145)
VIT B12 BLD-MCNC: 527 PG/ML (ref 211–946)
WBC # BLD AUTO: 7.03 10*3/MM3 (ref 3.4–10.8)

## 2021-04-08 PROCEDURE — 96160 PT-FOCUSED HLTH RISK ASSMT: CPT | Performed by: FAMILY MEDICINE

## 2021-04-08 PROCEDURE — 80053 COMPREHEN METABOLIC PANEL: CPT | Performed by: FAMILY MEDICINE

## 2021-04-08 PROCEDURE — 1159F MED LIST DOCD IN RCRD: CPT | Performed by: FAMILY MEDICINE

## 2021-04-08 PROCEDURE — 82607 VITAMIN B-12: CPT | Performed by: FAMILY MEDICINE

## 2021-04-08 PROCEDURE — 36415 COLL VENOUS BLD VENIPUNCTURE: CPT | Performed by: FAMILY MEDICINE

## 2021-04-08 PROCEDURE — 85027 COMPLETE CBC AUTOMATED: CPT | Performed by: FAMILY MEDICINE

## 2021-04-08 PROCEDURE — G0439 PPPS, SUBSEQ VISIT: HCPCS | Performed by: FAMILY MEDICINE

## 2021-04-08 PROCEDURE — 99213 OFFICE O/P EST LOW 20 MIN: CPT | Performed by: FAMILY MEDICINE

## 2021-04-08 PROCEDURE — 1170F FXNL STATUS ASSESSED: CPT | Performed by: FAMILY MEDICINE

## 2021-04-08 PROCEDURE — 83735 ASSAY OF MAGNESIUM: CPT | Performed by: FAMILY MEDICINE

## 2021-04-08 NOTE — PROGRESS NOTES
The ABCs of the Annual Wellness Visit  Subsequent Medicare Wellness Visit    No chief complaint on file.      Subjective   History of Present Illness:  Olivia Araujo is a 75 y.o. female who presents for a Subsequent Medicare Wellness Visit.    HEALTH RISK ASSESSMENT    Recent Hospitalizations:  No hospitalization(s) within the last year.    Current Medical Providers:  Patient Care Team:  Shaun Billingsley MD as PCP - General (Family Medicine)  Hannah Rubin MA as Medical Assistant  Janes Keene MA as Medical Assistant    Smoking Status:  Social History     Tobacco Use   Smoking Status Never Smoker   Smokeless Tobacco Never Used       Alcohol Consumption:  Social History     Substance and Sexual Activity   Alcohol Use No       Depression Screen:   PHQ-2/PHQ-9 Depression Screening 2/26/2020   Little interest or pleasure in doing things -   Feeling down, depressed, or hopeless 1   Total Score 1       Fall Risk Screen:  STEADI Fall Risk Assessment has not been completed.    Health Habits and Functional and Cognitive Screening:  No flowsheet data found.      Does the patient have evidence of cognitive impairment? No    Asprin use counseling:Does not need ASA (and currently is not on it)    Age-appropriate Screening Schedule:  Refer to the list below for future screening recommendations based on patient's age, sex and/or medical conditions. Orders for these recommended tests are listed in the plan section. The patient has been provided with a written plan.    Health Maintenance   Topic Date Due   • INFLUENZA VACCINE  08/01/2021   • MAMMOGRAM  09/18/2022   • TDAP/TD VACCINES (2 - Td) 10/31/2027   • COLONOSCOPY  10/21/2030   • LIPID PANEL  Discontinued   • DXA SCAN  Discontinued   • ZOSTER VACCINE  Discontinued          The following portions of the patient's history were reviewed and updated as appropriate: allergies, current medications, past family history, past medical history, past social history, past  surgical history and problem list.    Outpatient Medications Prior to Visit   Medication Sig Dispense Refill   • amLODIPine (NORVASC) 5 MG tablet Take 1 tablet by mouth Daily. For bp 90 tablet 3   • aspirin 325 MG tablet Take 325 mg by mouth daily. 1 tablet every day with lunch Oral     • Cholecalciferol (VITAMIN D-3 PO) Take 2,000 mg by mouth daily.     • esomeprazole (nexIUM) 40 MG capsule Take 40 mg by mouth Every Morning Before Breakfast.     • fexofenadine (ALLEGRA) 180 MG tablet Take 180 mg by mouth Every Night.     • fluticasone (FLONASE) 50 MCG/ACT nasal spray 2 sprays into the nostril(s) as directed by provider Daily As Needed for Rhinitis.     • meclizine 25 MG chewable tablet chewable tablet Chew 25 mg 3 (Three) Times a Day As Needed.     • Multiple Vitamins-Minerals (GNP HEALTHY EYES SUPERVISION PO) Take  by mouth 2 (Two) Times a Day.     • Pediatric Multivitamins-Iron (FLINTSTONES COMPLETE PO) Take 1 tablet by mouth Daily.     • Polyvinyl Alcohol-Povidone (REFRESH OP) Apply  to eye(s) as directed by provider As Needed.     • sucralfate (CARAFATE) 1 g tablet Take 1 g by mouth 3 (Three) Times a Day.     • Ventolin  (90 Base) MCG/ACT inhaler INHALE 2 PUFFS EVERY 4 (FOUR) HOURS AS NEEDED FOR WHEEZING. 18 g 0     Facility-Administered Medications Prior to Visit   Medication Dose Route Frequency Provider Last Rate Last Admin   • cyanocobalamin injection 1,000 mcg  1,000 mcg Intramuscular Q28 Days Shaun Billingsley MD   1,000 mcg at 11/05/20 1307       Patient Active Problem List   Diagnosis   • Cortical age-related cataract   • Nuclear sclerosis   • Posterior subcapsular polar age-related cataract   • Nuclear cataract   • Vitamin D deficiency   • GERD (gastroesophageal reflux disease)   • Essential hypertension   • Allergic rhinitis   • HAL (generalized anxiety disorder)   • Dry eye   • B12 deficiency       Advanced Care Planning:  ACP discussion was held with the patient during this visit. Patient has an  "advance directive (not in EMR), copy requested.    Review of Systems    Compared to one year ago, the patient feels her physical health is better.  Compared to one year ago, the patient feels her mental health is the same.    Reviewed chart for potential of high risk medication in the elderly: yes  Reviewed chart for potential of harmful drug interactions in the elderly:yes    Objective         Vitals:    04/08/21 1251   BP: 128/74   Weight: 68.9 kg (152 lb)   Height: 165.1 cm (65\")       Body mass index is 25.29 kg/m².  Discussed the patient's BMI with her. The BMI is in the acceptable range.    Physical Exam          Assessment/Plan   Medicare Risks and Personalized Health Plan  CMS Preventative Services Quick Reference  Advance Directive Discussion  Depression/Dysphoria    The above risks/problems have been discussed with the patient.  Pertinent information has been shared with the patient in the After Visit Summary.  Follow up plans and orders are seen below in the Assessment/Plan Section.    Diagnoses and all orders for this visit:    1. Essential hypertension (Primary)  -     Comprehensive Metabolic Panel  -     Magnesium    2. B12 deficiency  -     CBC (No Diff)  -     Vitamin B12    3. HAL (generalized anxiety disorder)    4. Medicare annual wellness visit, subsequent      Follow Up:  No follow-ups on file.     An After Visit Summary and PPPS were given to the patient.             "

## 2021-04-08 NOTE — PROGRESS NOTES
Subjective   Olivia Araujo is a 75 y.o. female.  Reevaluation hypertension B12 deficiency generalized anxiety disorder recurrent dizziness.  In interim says dizziness is better but comes and goes.  Is not had any B12 past several months for some reason.  We need to recheck all parameters.  Has gained a little weight blood pressure holding on single agent.  Is up-to-date on all other.    History of Present Illness   HPI    The following portions of the patient's history were reviewed and updated as appropriate: allergies, current medications, past family history, past medical history, past social history, past surgical history and problem list.    Review of Systems  Review of Systems   Constitutional: Negative for activity change, appetite change, fatigue and unexpected weight change.   HENT: Negative for trouble swallowing and voice change.    Eyes: Negative for redness and visual disturbance.   Respiratory: Negative for cough and wheezing.    Cardiovascular: Negative for chest pain and palpitations.   Gastrointestinal: Negative for abdominal pain, constipation, diarrhea, nausea and vomiting.   Genitourinary: Negative for urgency.   Musculoskeletal: Negative for joint swelling.   Neurological: Positive for dizziness (Chronic). Negative for syncope and headaches.   Hematological: Negative for adenopathy.   Psychiatric/Behavioral: Negative for sleep disturbance. The patient is nervous/anxious (Chronic improved slightly).        Objective   Physical Exam  Physical Exam  Constitutional:       Appearance: She is well-developed.   HENT:      Head: Normocephalic.   Eyes:      Pupils: Pupils are equal, round, and reactive to light.   Neck:      Thyroid: No thyromegaly.   Cardiovascular:      Rate and Rhythm: Normal rate and regular rhythm.      Heart sounds: Normal heart sounds. No murmur heard.   No friction rub. No gallop.    Pulmonary:      Breath sounds: Normal breath sounds.   Abdominal:      General: There is no  "distension.      Palpations: Abdomen is soft. There is no mass.      Tenderness: There is no abdominal tenderness.   Musculoskeletal:         General: Normal range of motion.      Cervical back: Normal range of motion.   Skin:     General: Skin is warm and dry.   Neurological:      Mental Status: She is alert and oriented to person, place, and time.      Deep Tendon Reflexes: Reflexes are normal and symmetric.   Psychiatric:         Mood and Affect: Mood is anxious.         Speech: Speech normal.         Behavior: Behavior normal.         Cognition and Memory: Cognition normal.           Visit Vitals  /74   Ht 165.1 cm (65\")   Wt 68.9 kg (152 lb)   BMI 25.29 kg/m²     Body mass index is 25.29 kg/m².      Assessment/Plan   Diagnoses and all orders for this visit:    1. Essential hypertension (Primary)  -     Comprehensive Metabolic Panel  -     Magnesium    2. B12 deficiency  -     CBC (No Diff)  -     Vitamin B12    3. HAL (generalized anxiety disorder)    4. Medicare annual wellness visit, subsequent    Counseled mainly on lifestyle measures continuing few medications has take counseled hydration and activity etc. lab work today recheck 6 months be time for mammogram as well  "

## 2021-04-21 ENCOUNTER — TELEPHONE (OUTPATIENT)
Dept: FAMILY MEDICINE CLINIC | Facility: CLINIC | Age: 76
End: 2021-04-21

## 2021-04-21 DIAGNOSIS — J41.1 BRONCHITIS, MUCOPURULENT RECURRENT (HCC): Primary | ICD-10-CM

## 2021-04-21 RX ORDER — ALBUTEROL SULFATE 1.25 MG/3ML
1 SOLUTION RESPIRATORY (INHALATION) EVERY 6 HOURS PRN
Qty: 60 EACH | Refills: 12 | Status: SHIPPED | OUTPATIENT
Start: 2021-04-21

## 2021-04-21 NOTE — TELEPHONE ENCOUNTER
[facility] needing to speak with you about getting Ms. Araujo' insurance information for the nebulizer prescription.

## 2021-04-26 RX ORDER — ALBUTEROL SULFATE 90 UG/1
2 AEROSOL, METERED RESPIRATORY (INHALATION) EVERY 4 HOURS PRN
Qty: 18 G | Refills: 5 | Status: SHIPPED | OUTPATIENT
Start: 2021-04-26 | End: 2022-07-29

## 2021-07-06 RX ORDER — FLUTICASONE PROPIONATE 50 MCG
SPRAY, SUSPENSION (ML) NASAL
Qty: 16 G | Refills: 11 | Status: SHIPPED | OUTPATIENT
Start: 2021-07-06 | End: 2022-07-15

## 2021-08-26 DIAGNOSIS — I10 ESSENTIAL HYPERTENSION: Chronic | ICD-10-CM

## 2021-08-26 RX ORDER — AMLODIPINE BESYLATE 5 MG/1
TABLET ORAL
Qty: 90 TABLET | Refills: 3 | Status: SHIPPED | OUTPATIENT
Start: 2021-08-26 | End: 2022-04-14

## 2021-10-08 ENCOUNTER — OFFICE VISIT (OUTPATIENT)
Dept: FAMILY MEDICINE CLINIC | Facility: CLINIC | Age: 76
End: 2021-10-08

## 2021-10-08 VITALS
HEART RATE: 95 BPM | SYSTOLIC BLOOD PRESSURE: 122 MMHG | OXYGEN SATURATION: 99 % | HEIGHT: 65 IN | BODY MASS INDEX: 24.74 KG/M2 | DIASTOLIC BLOOD PRESSURE: 70 MMHG | WEIGHT: 148.5 LBS | RESPIRATION RATE: 22 BRPM

## 2021-10-08 DIAGNOSIS — Z23 NEED FOR VACCINATION: ICD-10-CM

## 2021-10-08 DIAGNOSIS — I10 ESSENTIAL HYPERTENSION: Primary | Chronic | ICD-10-CM

## 2021-10-08 DIAGNOSIS — Z12.31 SCREENING MAMMOGRAM FOR BREAST CANCER: ICD-10-CM

## 2021-10-08 DIAGNOSIS — F41.1 GAD (GENERALIZED ANXIETY DISORDER): Chronic | ICD-10-CM

## 2021-10-08 PROBLEM — E53.8 B12 DEFICIENCY: Chronic | Status: RESOLVED | Noted: 2020-08-26 | Resolved: 2021-10-08

## 2021-10-08 PROCEDURE — G0008 ADMIN INFLUENZA VIRUS VAC: HCPCS | Performed by: FAMILY MEDICINE

## 2021-10-08 PROCEDURE — 90662 IIV NO PRSV INCREASED AG IM: CPT | Performed by: FAMILY MEDICINE

## 2021-10-08 PROCEDURE — 99213 OFFICE O/P EST LOW 20 MIN: CPT | Performed by: FAMILY MEDICINE

## 2021-10-08 NOTE — PROGRESS NOTES
Subjective   Olivia Araujo is a 76 y.o. female.  Reevaluation mild hypertension generalized anxiety disorder history of gastritis.  Symptoms maintain weight and blood pressure look in general is improved.  Does need a Covid booster and flu vaccine mammogram to be scheduled.  Medicines remain as stated.  History noted.    History of Present Illness   HPI    The following portions of the patient's history were reviewed and updated as appropriate: allergies, current medications, past family history, past medical history, past social history, past surgical history and problem list.    Review of Systems  Review of Systems   Constitutional: Negative for activity change, appetite change, fatigue and unexpected weight change.   HENT: Negative for trouble swallowing and voice change.    Eyes: Negative for redness and visual disturbance.   Respiratory: Negative for cough and wheezing.    Cardiovascular: Negative for chest pain and palpitations.   Gastrointestinal: Negative for abdominal pain, constipation, diarrhea, nausea and vomiting.   Genitourinary: Negative for urgency.   Musculoskeletal: Negative for joint swelling.   Neurological: Negative for syncope and headaches.   Hematological: Negative for adenopathy.   Psychiatric/Behavioral: Negative for sleep disturbance.       Objective   Physical Exam  Physical Exam  Constitutional:       Appearance: She is well-developed.   HENT:      Head: Normocephalic.   Eyes:      Pupils: Pupils are equal, round, and reactive to light.   Neck:      Thyroid: No thyromegaly.   Cardiovascular:      Rate and Rhythm: Normal rate and regular rhythm.      Heart sounds: Normal heart sounds. No murmur heard.   No friction rub. No gallop.    Pulmonary:      Breath sounds: Normal breath sounds.   Abdominal:      General: There is no distension.      Palpations: Abdomen is soft. There is no mass.      Tenderness: There is no abdominal tenderness.   Musculoskeletal:         General: Normal range  "of motion.      Cervical back: Normal range of motion.      Comments: Trace edema 1-2+ pulses get up and go 3 to 5 seconds gait normal   Skin:     General: Skin is warm and dry.   Neurological:      Mental Status: She is alert and oriented to person, place, and time.      Deep Tendon Reflexes: Reflexes are normal and symmetric.   Psychiatric:         Attention and Perception: Attention normal.         Mood and Affect: Mood normal.         Speech: Speech normal.         Behavior: Behavior normal.         Thought Content: Thought content normal.         Cognition and Memory: Cognition normal.           Visit Vitals  /70 (BP Location: Left arm, Patient Position: Sitting, Cuff Size: Adult)   Pulse 95   Resp 22   Ht 165.1 cm (65\")   Wt 67.4 kg (148 lb 8 oz)   SpO2 99%   BMI 24.71 kg/m²     Body mass index is 24.71 kg/m².    /70 (BP Location: Left arm, Patient Position: Sitting, Cuff Size: Adult)   Pulse 95   Resp 22   Ht 165.1 cm (65\")   Wt 67.4 kg (148 lb 8 oz)   SpO2 99%   BMI 24.71 kg/m²     Assessment/Plan   Diagnoses and all orders for this visit:    1. Essential hypertension (Primary)    2. HAL (generalized anxiety disorder)    3. Screening mammogram for breast cancer  -     Mammo Screening Digital Tomosynthesis Bilateral With CAD    4. Need for vaccination  -     Fluzone High-Dose 65+yrs    Counseled mainly on fall prevention hydration continue lifestyle measures.  Recheck 6 months be time for a lab and subsequent Medicare  "

## 2021-10-13 ENCOUNTER — TELEPHONE (OUTPATIENT)
Dept: FAMILY MEDICINE CLINIC | Facility: CLINIC | Age: 76
End: 2021-10-13

## 2021-10-13 NOTE — TELEPHONE ENCOUNTER
Mrs. Araujo would like to speak with you. She saw on the news that you are not suppose to be taking aspirin if you had not had a heart attack. She is concerned and would like to speak with you. Please call back @ [phone]

## 2021-10-29 ENCOUNTER — TELEPHONE (OUTPATIENT)
Dept: FAMILY MEDICINE CLINIC | Facility: CLINIC | Age: 76
End: 2021-10-29

## 2021-10-29 NOTE — TELEPHONE ENCOUNTER
PT IS CURRENTLY TAKING amLODIPine (NORVASC) 5 MG tablet AND BELIEVES IT IS CAUSING HER SEVERE DIZZINESS AND HEADACHES.    WOULD LIKE TO CUT DOSE IN HALF TO SEE IF SYMPTOMS IMPROVE.     PLEASE ADVISE.    [phone]

## 2021-11-05 ENCOUNTER — TELEPHONE (OUTPATIENT)
Dept: FAMILY MEDICINE CLINIC | Facility: CLINIC | Age: 76
End: 2021-11-05

## 2021-11-05 NOTE — TELEPHONE ENCOUNTER
Pt spoke with Angelica Henry on 10/29 and was advised to make appt regarding medication. It looks like it was not done. Would you like pt to be seen or discuss medication over phone call?

## 2021-11-05 NOTE — TELEPHONE ENCOUNTER
Called pt and gave instructions per Dr. Billingsley. Advised if BP starts elevating with at-home readings or any new symptoms start to call office/make appt. Pt verbalized understanding.

## 2021-11-05 NOTE — TELEPHONE ENCOUNTER
Mrs. Araujo called and is wanting Dr Billingsley to take her off of her amlodipine. She stated she has tried taking them in the morning and they make her dizzy. She has tried taking them in the evening and they make her dizzy. She is not comfortable taking this medication since she lives by herself and does not like felling dizzy. Please call back @ [phone]

## 2021-12-02 ENCOUNTER — TELEPHONE (OUTPATIENT)
Dept: FAMILY MEDICINE CLINIC | Facility: CLINIC | Age: 76
End: 2021-12-02

## 2021-12-02 NOTE — TELEPHONE ENCOUNTER
Please call pt concerning her /83 with pulse of 83 - has been 155/88 on 11/23. She wants to know if there is anything else she can take besides the amlodipine. Thanks!

## 2022-02-08 ENCOUNTER — TELEPHONE (OUTPATIENT)
Dept: FAMILY MEDICINE CLINIC | Facility: CLINIC | Age: 77
End: 2022-02-08

## 2022-02-08 NOTE — TELEPHONE ENCOUNTER
Pt called about pain starting in her ear and all the way down her neck and was requesting some ear drops to be called into the pharmacy.

## 2022-04-14 ENCOUNTER — LAB (OUTPATIENT)
Dept: LAB | Facility: HOSPITAL | Age: 77
End: 2022-04-14

## 2022-04-14 ENCOUNTER — OFFICE VISIT (OUTPATIENT)
Dept: FAMILY MEDICINE CLINIC | Facility: CLINIC | Age: 77
End: 2022-04-14

## 2022-04-14 VITALS
DIASTOLIC BLOOD PRESSURE: 84 MMHG | WEIGHT: 148.3 LBS | HEIGHT: 65 IN | SYSTOLIC BLOOD PRESSURE: 146 MMHG | BODY MASS INDEX: 24.71 KG/M2

## 2022-04-14 DIAGNOSIS — K21.9 GASTROESOPHAGEAL REFLUX DISEASE WITHOUT ESOPHAGITIS: Chronic | ICD-10-CM

## 2022-04-14 DIAGNOSIS — K58.9 IRRITABLE BOWEL SYNDROME WITHOUT DIARRHEA: ICD-10-CM

## 2022-04-14 DIAGNOSIS — I10 ESSENTIAL HYPERTENSION: Primary | Chronic | ICD-10-CM

## 2022-04-14 DIAGNOSIS — Z13.220 SCREENING CHOLESTEROL LEVEL: ICD-10-CM

## 2022-04-14 DIAGNOSIS — Z00.00 MEDICARE ANNUAL WELLNESS VISIT, SUBSEQUENT: ICD-10-CM

## 2022-04-14 DIAGNOSIS — F41.1 GAD (GENERALIZED ANXIETY DISORDER): Chronic | ICD-10-CM

## 2022-04-14 LAB
CHOLEST SERPL-MCNC: 204 MG/DL (ref 0–200)
HDLC SERPL-MCNC: 82 MG/DL (ref 40–60)
LDLC SERPL CALC-MCNC: 105 MG/DL (ref 0–100)
LDLC/HDLC SERPL: 1.24 {RATIO}
TRIGL SERPL-MCNC: 100 MG/DL (ref 0–150)
VLDLC SERPL-MCNC: 17 MG/DL (ref 5–40)

## 2022-04-14 PROCEDURE — 1159F MED LIST DOCD IN RCRD: CPT | Performed by: FAMILY MEDICINE

## 2022-04-14 PROCEDURE — 36415 COLL VENOUS BLD VENIPUNCTURE: CPT | Performed by: FAMILY MEDICINE

## 2022-04-14 PROCEDURE — 85027 COMPLETE CBC AUTOMATED: CPT | Performed by: FAMILY MEDICINE

## 2022-04-14 PROCEDURE — 80061 LIPID PANEL: CPT | Performed by: FAMILY MEDICINE

## 2022-04-14 PROCEDURE — 80053 COMPREHEN METABOLIC PANEL: CPT | Performed by: FAMILY MEDICINE

## 2022-04-14 PROCEDURE — G0439 PPPS, SUBSEQ VISIT: HCPCS | Performed by: FAMILY MEDICINE

## 2022-04-14 PROCEDURE — 96160 PT-FOCUSED HLTH RISK ASSMT: CPT | Performed by: FAMILY MEDICINE

## 2022-04-14 PROCEDURE — 99214 OFFICE O/P EST MOD 30 MIN: CPT | Performed by: FAMILY MEDICINE

## 2022-04-14 PROCEDURE — 1126F AMNT PAIN NOTED NONE PRSNT: CPT | Performed by: FAMILY MEDICINE

## 2022-04-14 NOTE — PROGRESS NOTES
Subjective   Olivia Araujo is a 76 y.o. female.  Reevaluation hypertension irritable bowel syndrome generalized anxiety disorder chronic allergic rhinitis diagnoses below.  In the interim stopped amlodipine due to dizzy side effects which may or may not be the case.  Is been checking blood pressure at home.  Systolic over the past several months has been running between 130-158.  Diastolic running in the mid 80s.  Pulse is in the 70s to 80s.  Probably does need a little bit of help with pressure but not a great deal.  Is maintain weight and blood pressure.  Continues to see gastroenterology elsewhere for her GI complaints.  Medicines are reviewed.    History of Present Illness   HPI    The following portions of the patient's history were reviewed and updated as appropriate: allergies, current medications, past family history, past medical history, past social history, past surgical history and problem list.    Review of Systems  Review of Systems   Constitutional: Negative for activity change, appetite change, fatigue and unexpected weight change.   HENT: Negative for trouble swallowing and voice change.    Eyes: Negative for redness and visual disturbance.   Respiratory: Negative for cough and wheezing.    Cardiovascular: Negative for chest pain and palpitations.   Gastrointestinal: Positive for abdominal pain (Chronic but no weight loss). Negative for constipation, diarrhea, nausea and vomiting.   Genitourinary: Negative for urgency.   Musculoskeletal: Negative for joint swelling.   Neurological: Negative for syncope and headaches.   Hematological: Negative for adenopathy.   Psychiatric/Behavioral: Negative for sleep disturbance. The patient is nervous/anxious (Stable).        Objective   Physical Exam  Physical Exam  Constitutional:       Appearance: She is well-developed.   HENT:      Head: Normocephalic.   Eyes:      Pupils: Pupils are equal, round, and reactive to light.   Neck:      Thyroid: No  "thyromegaly.   Cardiovascular:      Rate and Rhythm: Normal rate and regular rhythm.      Heart sounds: Normal heart sounds. No murmur heard.    No friction rub. No gallop.   Pulmonary:      Breath sounds: Normal breath sounds.   Abdominal:      General: There is no distension.      Palpations: Abdomen is soft. There is no mass.      Tenderness: There is no abdominal tenderness.   Musculoskeletal:         General: Normal range of motion.      Cervical back: Normal range of motion.      Comments: No edema 2+ pulses get up and go 3 to 5-second   Skin:     General: Skin is warm and dry.   Neurological:      Mental Status: She is alert and oriented to person, place, and time.      Deep Tendon Reflexes: Reflexes are normal and symmetric.   Psychiatric:         Mood and Affect: Mood normal.         Speech: Speech normal.         Behavior: Behavior is cooperative.         Cognition and Memory: Cognition normal.           Visit Vitals  /84   Ht 165.1 cm (65\")   Wt 67.3 kg (148 lb 4.8 oz)   BMI 24.68 kg/m²     Body mass index is 24.68 kg/m².  /84   Ht 165.1 cm (65\")   Wt 67.3 kg (148 lb 4.8 oz)   BMI 24.68 kg/m²       Assessment/Plan   Diagnoses and all orders for this visit:    1. Essential hypertension (Primary)  -     Comprehensive Metabolic Panel  -     verapamil SR (CALAN-SR) 120 MG CR tablet; Take 1 tablet by mouth Every Night. For bp  Dispense: 90 tablet; Refill: 3    2. HAL (generalized anxiety disorder)    3. Gastroesophageal reflux disease without esophagitis    4. Medicare annual wellness visit, subsequent    5. Irritable bowel syndrome without diarrhea  -     CBC (No Diff)    6. Screening cholesterol level  -     Lipid Panel With LDL / HDL Ratio    Counseled on diet exercise following up with subspecialist.  Counseled getting fourth Covid booster in the summer.  Lab work today.  Start low-dose of verapamil at night to help with blood pressure with minimal side effects.  Keep a record at home.  " Counseled keeping systolic around 130-135.  Orders as above.  Recheck 6 months on blood pressure

## 2022-04-14 NOTE — PROGRESS NOTES
The ABCs of the Annual Wellness Visit  Subsequent Medicare Wellness Visit    Chief Complaint   Patient presents with   • Hypertension     6 mo f/u       Subjective    History of Present Illness:  Olivia Araujo is a 76 y.o. female who presents for a Subsequent Medicare Wellness Visit.    The following portions of the patient's history were reviewed and   updated as appropriate: allergies, current medications, past family history, past medical history, past social history, past surgical history and problem list.    Compared to one year ago, the patient feels her physical   health is the same.    Compared to one year ago, the patient feels her mental   health is the same.    Recent Hospitalizations:  She was not admitted to the hospital during the last year.       Current Medical Providers:  Patient Care Team:  Shaun Billingsley MD as PCP - General (Family Medicine)  Hannah Rubin MA as Medical Assistant  Janes Keene MA as Medical Assistant    Outpatient Medications Prior to Visit   Medication Sig Dispense Refill   • albuterol (ACCUNEB) 1.25 MG/3ML nebulizer solution Take 3 mL by nebulization Every 6 (Six) Hours As Needed for Wheezing. 60 each 12   • albuterol sulfate HFA (Ventolin HFA) 108 (90 Base) MCG/ACT inhaler Inhale 2 puffs Every 4 (Four) Hours As Needed for Wheezing. 18 g 5   • aspirin 325 MG tablet Take 325 mg by mouth Daily. 1 tablet every day with lunch Oral     • Cholecalciferol (VITAMIN D-3 PO) Take 2,000 mg by mouth daily.     • esomeprazole (nexIUM) 40 MG capsule Take 40 mg by mouth Every Morning Before Breakfast.     • fluticasone (FLONASE) 50 MCG/ACT nasal spray INHALE 2 SPRAYS IN THE NOSTRIL(S) AS DIRECTED BY THE PROVIDER DAILY 16 g 11   • Multiple Vitamins-Minerals (GNP HEALTHY EYES SUPERVISION PO) Take  by mouth 2 (Two) Times a Day.     • ondansetron (ZOFRAN) 4 MG tablet Take 4 mg by mouth Every 8 (Eight) Hours As Needed. for nausea     • Pediatric Multivitamins-Iron (FLINTSTONES COMPLETE  "PO) Take 1 tablet by mouth Daily.     • Polyvinyl Alcohol-Povidone (REFRESH OP) Apply  to eye(s) as directed by provider As Needed.     • simethicone (MYLICON,GAS-X) 125 MG capsule capsule Take 1 tablet by mouth Every 6 (Six) Hours.     • sucralfate (CARAFATE) 1 g tablet Take 1 g by mouth 3 (Three) Times a Day.     • amLODIPine (NORVASC) 5 MG tablet TAKE ONE TABLET BY MOUTH DAILY FOR BLOOD PRESSURE 90 tablet 3   • fexofenadine (ALLEGRA) 180 MG tablet Take 180 mg by mouth Every Night.     • meclizine 25 MG chewable tablet chewable tablet Chew 25 mg 3 (Three) Times a Day As Needed.       No facility-administered medications prior to visit.       No opioid medication identified on active medication list. I have reviewed chart for other potential  high risk medication/s and harmful drug interactions in the elderly.          Aspirin is on active medication list. Aspirin use is not indicated based on review of current medical condition/s. Risk of harm outweighs potential benefits. Patient instructed to discontinue this medication.  .      Patient Active Problem List   Diagnosis   • Nuclear sclerosis   • Posterior subcapsular polar age-related cataract   • Nuclear cataract   • GERD (gastroesophageal reflux disease)   • Essential hypertension   • Allergic rhinitis   • HAL (generalized anxiety disorder)   • Dry eye   • Irritable bowel syndrome without diarrhea     Advance Care Planning  Advance Directive is not on file.  ACP discussion was held with the patient during this visit. Patient does not have an advance directive, information provided.          Objective    Vitals:    04/14/22 1057   BP: 146/84   Weight: 67.3 kg (148 lb 4.8 oz)   Height: 165.1 cm (65\")   PainSc: 0-No pain     BMI Readings from Last 1 Encounters:   04/14/22 24.68 kg/m²   BMI is within normal parameters. No follow-up required.    Does the patient have evidence of cognitive impairment? No    Physical Exam            HEALTH RISK ASSESSMENT    Smoking " Status:  Social History     Tobacco Use   Smoking Status Never Smoker   Smokeless Tobacco Never Used     Alcohol Consumption:  Social History     Substance and Sexual Activity   Alcohol Use No     Fall Risk Screen:    LEYLAADI Fall Risk Assessment was completed, and patient is at LOW risk for falls.Assessment completed on:4/14/2022    Depression Screening:  PHQ-2/PHQ-9 Depression Screening 4/14/2022   Retired PHQ-9 Total Score -   Retired Total Score -   Little Interest or Pleasure in Doing Things 0-->not at all   Feeling Down, Depressed or Hopeless 1-->several days   Trouble Falling or Staying Asleep, or Sleeping Too Much 0-->not at all   Feeling Tired or Having Little Energy 1-->several days   Poor Appetite or Overeating 0-->not at all   Feeling Bad about Yourself - or that You are a Failure or Have Let Yourself or Your Family Down 0-->not at all   Trouble Concentrating on Things, Such as Reading the Newspaper or Watching Television 0-->not at all   Moving or Speaking So Slowly that Other People Could Have Noticed? Or the Opposite - Being So Fidgety 0-->not at all   Thoughts that You Would be Better Off Dead or of Hurting Yourself in Some Way 0-->not at all   PHQ-9: Brief Depression Severity Measure Score 2   If You Checked Off Any Problems, How Difficult Have These Problems Made It For You to Do Your Work, Take Care of Things at Home, or Get Along with Other People? not difficult at all       Health Habits and Functional and Cognitive Screening:  No flowsheet data found.    Age-appropriate Screening Schedule:  Refer to the list below for future screening recommendations based on patient's age, sex and/or medical conditions. Orders for these recommended tests are listed in the plan section. The patient has been provided with a written plan.    Health Maintenance   Topic Date Due   • INFLUENZA VACCINE  08/01/2022   • MAMMOGRAM  11/02/2023   • TDAP/TD VACCINES (2 - Td or Tdap) 10/31/2027   • LIPID PANEL  Discontinued    • DXA SCAN  Discontinued   • ZOSTER VACCINE  Discontinued              Assessment/Plan   CMS Preventative Services Quick Reference  Risk Factors Identified During Encounter  Immunizations Discussed/Encouraged (specific Immunizations; COVID19  Inactivity/Sedentary  The above risks/problems have been discussed with the patient.  Follow up actions/plans if indicated are seen below in the Assessment/Plan Section.  Pertinent information has been shared with the patient in the After Visit Summary.    Diagnoses and all orders for this visit:    1. Essential hypertension (Primary)  -     Comprehensive Metabolic Panel  -     verapamil SR (CALAN-SR) 120 MG CR tablet; Take 1 tablet by mouth Every Night. For bp  Dispense: 90 tablet; Refill: 3    2. HAL (generalized anxiety disorder)    3. Gastroesophageal reflux disease without esophagitis    4. Medicare annual wellness visit, subsequent    5. Irritable bowel syndrome without diarrhea  -     CBC (No Diff)    6. Screening cholesterol level  -     Lipid Panel With LDL / HDL Ratio        Follow Up:   No follow-ups on file.     An After Visit Summary and PPPS were made available to the patient.

## 2022-04-15 LAB
ALBUMIN SERPL-MCNC: 4.6 G/DL (ref 3.5–5.2)
ALBUMIN/GLOB SERPL: 1.8 G/DL
ALP SERPL-CCNC: 106 U/L (ref 39–117)
ALT SERPL W P-5'-P-CCNC: 9 U/L (ref 1–33)
ANION GAP SERPL CALCULATED.3IONS-SCNC: 15 MMOL/L (ref 5–15)
AST SERPL-CCNC: 17 U/L (ref 1–32)
BILIRUB SERPL-MCNC: 0.3 MG/DL (ref 0–1.2)
BUN SERPL-MCNC: 8 MG/DL (ref 8–23)
BUN/CREAT SERPL: 8.5 (ref 7–25)
CALCIUM SPEC-SCNC: 9.3 MG/DL (ref 8.6–10.5)
CHLORIDE SERPL-SCNC: 99 MMOL/L (ref 98–107)
CO2 SERPL-SCNC: 23 MMOL/L (ref 22–29)
CREAT SERPL-MCNC: 0.94 MG/DL (ref 0.57–1)
DEPRECATED RDW RBC AUTO: 44.2 FL (ref 37–54)
EGFRCR SERPLBLD CKD-EPI 2021: 62.6 ML/MIN/1.73
ERYTHROCYTE [DISTWIDTH] IN BLOOD BY AUTOMATED COUNT: 13.1 % (ref 12.3–15.4)
GLOBULIN UR ELPH-MCNC: 2.5 GM/DL
GLUCOSE SERPL-MCNC: 88 MG/DL (ref 65–99)
HCT VFR BLD AUTO: 40.4 % (ref 34–46.6)
HGB BLD-MCNC: 14.1 G/DL (ref 12–15.9)
MCH RBC QN AUTO: 32.4 PG (ref 26.6–33)
MCHC RBC AUTO-ENTMCNC: 34.9 G/DL (ref 31.5–35.7)
MCV RBC AUTO: 92.9 FL (ref 79–97)
PLATELET # BLD AUTO: 295 10*3/MM3 (ref 140–450)
PMV BLD AUTO: 10.2 FL (ref 6–12)
POTASSIUM SERPL-SCNC: 4.9 MMOL/L (ref 3.5–5.2)
PROT SERPL-MCNC: 7.1 G/DL (ref 6–8.5)
RBC # BLD AUTO: 4.35 10*6/MM3 (ref 3.77–5.28)
SODIUM SERPL-SCNC: 137 MMOL/L (ref 136–145)
WBC NRBC COR # BLD: 7.16 10*3/MM3 (ref 3.4–10.8)

## 2022-07-15 RX ORDER — FLUTICASONE PROPIONATE 50 MCG
SPRAY, SUSPENSION (ML) NASAL
Qty: 16 G | Refills: 11 | Status: SHIPPED | OUTPATIENT
Start: 2022-07-15 | End: 2022-12-14

## 2022-07-29 RX ORDER — ALBUTEROL SULFATE 90 UG/1
2 AEROSOL, METERED RESPIRATORY (INHALATION) EVERY 4 HOURS PRN
Qty: 18 G | Refills: 5 | Status: SHIPPED | OUTPATIENT
Start: 2022-07-29

## 2022-10-04 ENCOUNTER — TELEPHONE (OUTPATIENT)
Dept: FAMILY MEDICINE CLINIC | Facility: CLINIC | Age: 77
End: 2022-10-04

## 2022-10-04 NOTE — TELEPHONE ENCOUNTER
Pt has throat infection and is spitting up phleem greenish beige in color and needs Hannah to call .    Pt says needs antibiotic to clear it up and has lost her voice pt [phone]    [facility]

## 2022-10-20 ENCOUNTER — OFFICE VISIT (OUTPATIENT)
Dept: FAMILY MEDICINE CLINIC | Facility: CLINIC | Age: 77
End: 2022-10-20

## 2022-10-20 VITALS
SYSTOLIC BLOOD PRESSURE: 122 MMHG | BODY MASS INDEX: 24.66 KG/M2 | WEIGHT: 148 LBS | HEIGHT: 65 IN | DIASTOLIC BLOOD PRESSURE: 72 MMHG

## 2022-10-20 DIAGNOSIS — Z23 NEED FOR VACCINATION: ICD-10-CM

## 2022-10-20 DIAGNOSIS — K58.9 IRRITABLE BOWEL SYNDROME WITHOUT DIARRHEA: Chronic | ICD-10-CM

## 2022-10-20 DIAGNOSIS — F41.1 GAD (GENERALIZED ANXIETY DISORDER): Chronic | ICD-10-CM

## 2022-10-20 DIAGNOSIS — I10 ESSENTIAL HYPERTENSION: Primary | Chronic | ICD-10-CM

## 2022-10-20 DIAGNOSIS — Z12.31 BREAST CANCER SCREENING BY MAMMOGRAM: ICD-10-CM

## 2022-10-20 DIAGNOSIS — Z13.220 SCREENING CHOLESTEROL LEVEL: ICD-10-CM

## 2022-10-20 DIAGNOSIS — R42 CHRONIC VERTIGO: ICD-10-CM

## 2022-10-20 PROCEDURE — 99214 OFFICE O/P EST MOD 30 MIN: CPT | Performed by: FAMILY MEDICINE

## 2022-10-20 PROCEDURE — G0008 ADMIN INFLUENZA VIRUS VAC: HCPCS | Performed by: FAMILY MEDICINE

## 2022-10-20 PROCEDURE — 90662 IIV NO PRSV INCREASED AG IM: CPT | Performed by: FAMILY MEDICINE

## 2022-10-20 NOTE — PROGRESS NOTES
Subjective   Olivia Araujo is a 77 y.o. female.  Reevaluation hypertension generalized anxiety disorder chronic vertigo diagnosis below.  Interim Daniel seems to be working well for blood pressure.  States having several elevated blood pressure readings at home but no over the list of time at the average still running about 130/80 or less.  Unfortuneately great deal of HAL makes history taking somewhat difficult.  Continue complain of chronic vertigo.  We have counseled on possible ENT consultation for same.  Is time for flu vaccine due for mammogram next month.  Counseled on COVID-vaccine.  History noted    History of Present Illness   HPI    The following portions of the patient's history were reviewed and updated as appropriate: allergies, current medications, past family history, past medical history, past social history, past surgical history and problem list.    Review of Systems  Review of Systems   Constitutional: Negative for activity change, appetite change, fatigue and unexpected weight change.   HENT: Negative for trouble swallowing and voice change.    Eyes: Negative for redness and visual disturbance.   Respiratory: Negative for cough and wheezing.    Cardiovascular: Negative for chest pain and palpitations.   Gastrointestinal: Negative for abdominal pain, constipation, diarrhea, nausea and vomiting.   Genitourinary: Negative for urgency.   Musculoskeletal: Negative for joint swelling.   Neurological: Positive for dizziness. Negative for syncope and headaches.   Hematological: Negative for adenopathy.   Psychiatric/Behavioral: Negative for sleep disturbance. The patient is nervous/anxious.        Objective   Physical Exam  Physical Exam  Constitutional:       Appearance: She is well-developed.   HENT:      Head: Normocephalic.   Eyes:      Pupils: Pupils are equal, round, and reactive to light.   Neck:      Thyroid: No thyromegaly.   Cardiovascular:      Rate and Rhythm: Normal rate and regular  "rhythm.      Heart sounds: Normal heart sounds. No murmur heard.    No friction rub. No gallop.   Pulmonary:      Breath sounds: Normal breath sounds.   Abdominal:      General: There is no distension.      Palpations: Abdomen is soft. There is no mass.      Tenderness: There is no abdominal tenderness.   Musculoskeletal:         General: Normal range of motion.      Cervical back: Normal range of motion.      Comments: No peripheral edema trace venous insufficiency 1-2+ pulses get up and go 3-5 sec   Skin:     General: Skin is warm and dry.   Neurological:      Mental Status: She is alert and oriented to person, place, and time.      Deep Tendon Reflexes: Reflexes are normal and symmetric.   Psychiatric:         Mood and Affect: Mood is anxious.         Speech: Speech is tangential.         Behavior: Behavior is cooperative.           Visit Vitals  /72   Ht 165.1 cm (65\")   Wt 67.1 kg (148 lb)   BMI 24.63 kg/m²     Body mass index is 24.63 kg/m².  /72   Ht 165.1 cm (65\")   Wt 67.1 kg (148 lb)   BMI 24.63 kg/m²       Assessment/Plan   Diagnoses and all orders for this visit:    1. Essential hypertension (Primary)  -     Magnesium; Future  -     Comprehensive Metabolic Panel; Future    2. HAL (generalized anxiety disorder)    3. Need for vaccination  -     Fluzone High-Dose 65+yrs    4. Breast cancer screening by mammogram  -     Mammo Screening Digital Tomosynthesis Bilateral With CAD; Future    5. Screening cholesterol level  -     Lipid Panel With LDL / HDL Ratio; Future    6. Irritable bowel syndrome without diarrhea  -     CBC (No Diff); Future    7. Chronic vertigo  -     Ambulatory Referral to ENT (Otolaryngology)    Have counseled need for continuing on current medicines counseled on infection prevention hydration fall prevention is in the past.  Orders as above.  Recheck 6 months lab prior  "

## 2022-12-13 ENCOUNTER — PREP FOR SURGERY (OUTPATIENT)
Dept: OTHER | Facility: HOSPITAL | Age: 77
End: 2022-12-13

## 2022-12-13 DIAGNOSIS — R07.9 CHEST PAIN, UNSPECIFIED: Primary | ICD-10-CM

## 2022-12-13 RX ORDER — SODIUM CHLORIDE 9 MG/ML
40 INJECTION, SOLUTION INTRAVENOUS AS NEEDED
Status: CANCELLED | OUTPATIENT
Start: 2022-12-13

## 2022-12-13 RX ORDER — DEXTROSE AND SODIUM CHLORIDE 5; .45 G/100ML; G/100ML
30 INJECTION, SOLUTION INTRAVENOUS CONTINUOUS PRN
Status: CANCELLED | OUTPATIENT
Start: 2022-12-15

## 2022-12-14 PROBLEM — R07.9 CHEST PAIN, UNSPECIFIED: Status: ACTIVE | Noted: 2022-12-14

## 2022-12-14 RX ORDER — FLUTICASONE PROPIONATE 50 MCG
2 SPRAY, SUSPENSION (ML) NASAL DAILY PRN
COMMUNITY

## 2022-12-15 ENCOUNTER — HOSPITAL ENCOUNTER (OUTPATIENT)
Facility: HOSPITAL | Age: 77
Setting detail: HOSPITAL OUTPATIENT SURGERY
Discharge: HOME | End: 2022-12-15
Attending: INTERNAL MEDICINE | Admitting: INTERNAL MEDICINE

## 2022-12-15 ENCOUNTER — ANESTHESIA EVENT (OUTPATIENT)
Dept: GASTROENTEROLOGY | Facility: HOSPITAL | Age: 77
End: 2022-12-15

## 2022-12-15 ENCOUNTER — ANESTHESIA (OUTPATIENT)
Dept: GASTROENTEROLOGY | Facility: HOSPITAL | Age: 77
End: 2022-12-15

## 2022-12-15 VITALS
WEIGHT: 144.6 LBS | OXYGEN SATURATION: 96 % | RESPIRATION RATE: 18 BRPM | HEART RATE: 74 BPM | DIASTOLIC BLOOD PRESSURE: 81 MMHG | HEIGHT: 65 IN | TEMPERATURE: 97.5 F | SYSTOLIC BLOOD PRESSURE: 140 MMHG | BODY MASS INDEX: 24.09 KG/M2

## 2022-12-15 DIAGNOSIS — R07.9 CHEST PAIN, UNSPECIFIED: ICD-10-CM

## 2022-12-15 PROCEDURE — 88305 TISSUE EXAM BY PATHOLOGIST: CPT

## 2022-12-15 PROCEDURE — 25010000002 PROPOFOL 10 MG/ML EMULSION: Performed by: NURSE ANESTHETIST, CERTIFIED REGISTERED

## 2022-12-15 RX ORDER — LIDOCAINE HYDROCHLORIDE 20 MG/ML
INJECTION, SOLUTION INTRAVENOUS AS NEEDED
Status: DISCONTINUED | OUTPATIENT
Start: 2022-12-15 | End: 2022-12-15 | Stop reason: SURG

## 2022-12-15 RX ORDER — DEXTROSE AND SODIUM CHLORIDE 5; .45 G/100ML; G/100ML
30 INJECTION, SOLUTION INTRAVENOUS CONTINUOUS PRN
Status: DISCONTINUED | OUTPATIENT
Start: 2022-12-15 | End: 2022-12-15 | Stop reason: HOSPADM

## 2022-12-15 RX ORDER — PROPOFOL 10 MG/ML
VIAL (ML) INTRAVENOUS AS NEEDED
Status: DISCONTINUED | OUTPATIENT
Start: 2022-12-15 | End: 2022-12-15 | Stop reason: SURG

## 2022-12-15 RX ADMIN — LIDOCAINE HYDROCHLORIDE 60 MG: 20 INJECTION, SOLUTION INTRAVENOUS at 10:55

## 2022-12-15 RX ADMIN — PROPOFOL 80 MG: 10 INJECTION, EMULSION INTRAVENOUS at 10:55

## 2022-12-15 RX ADMIN — DEXTROSE AND SODIUM CHLORIDE 30 ML/HR: 5; 450 INJECTION, SOLUTION INTRAVENOUS at 10:27

## 2022-12-15 NOTE — ANESTHESIA PREPROCEDURE EVALUATION
Anesthesia Evaluation     Patient summary reviewed and Nursing notes reviewed   NPO Solid Status: > 8 hours  NPO Liquid Status: > 6 hours           Airway   Mallampati: II  TM distance: >3 FB  Neck ROM: full  No difficulty expected  Dental    (+) upper dentures    Pulmonary - normal exam   (+) asthma,  Cardiovascular - normal exam    (+) hypertension, hyperlipidemia,       Neuro/Psych  (+) psychiatric history Anxiety and Depression,    GI/Hepatic/Renal/Endo    (+)  hiatal hernia, GERD,      Musculoskeletal     Abdominal  - normal exam   Substance History - negative use     OB/GYN negative ob/gyn ROS         Other   arthritis,    history of cancer remission                      Anesthesia Plan    ASA 3     general     intravenous induction     Anesthetic plan, risks, benefits, and alternatives have been provided, discussed and informed consent has been obtained with: patient.

## 2022-12-15 NOTE — H&P
Brooke Conner DO,[facility]  Gastroenterology  Hepatology  Endoscopy  Board Certified in Internal Medicine and gastroenterology  [address], [address]  [place]. [address]  - [phone]   F - [phone]     GASTROENTEROLOGY HISTORY AND PHYSICAL  NOTE   BROOKE CONNER DO.         SUBJECTIVE:   12/15/2022    Name: Olivia Araujo  DOD: 1945        Chief Complaint:       Subjective : Chest pain.  History of Colles' Niesen fundoplication  Patient is 77 y.o. female presents with desire for elective EGD with biopsy.      ROS/HISTORY/ CURRENT MEDICATIONS/OBJECTIVE/VS/PE:   Review of Systems:  All systems unremarkable unless specified below.  Constitutional   HENT  Eyes   Respiratory    Cardiovascular  Gastrointestinal   Endocrine  Genitourinary    Musculoskeletal   Skin  Allergic/Immunologic    Neurological    Hematological  Psychiatric/Behavioral    History:     Past Medical History:   Diagnosis Date   • Acute suppurative otitis media without spontaneous rupture of ear drum     LEFT   • Allergic rhinitis    • Anxiety    • Arthritis    • Asthma    • Astigmatism    • Bladder disorder     leaks   • Cataract    • Colitis    • Cortical senile cataract    • Dry eye    • Dysphagia     POST-OPERATIVE   • Encounter for breast cancer screening other than mammogram    • Essential hypertension     no bp meds now   • Fatigue    • Filamentary keratitis of left eye    • GERD (gastroesophageal reflux disease)    • Hiatal hernia    • High blood pressure    • History of mammography, diagnostic 10/02/2009    MAMMOGRAM BREAST DIAGNOSTIC [place] 84[address] (Probably benign findings-Recommend continued follow-up at the time of the patient's regular screening mammogram)   • History of mammography, diagnostic 03/31/2009    MAMMOGRAM BREAST DIAGNOSTIC LT 99021 (Probably benign punctate calcifications identified .Short interval follow-up suggested.Follow-up in 6 mos)   • Hyperlipidemia    • Hyponatremia    • Irritable bowel  syndrome    • Keratoconjunctivitis sicca (HCC)    • Labyrinthitis     CHRONIC   • Myopia    • Posterior subcapsular polar senile cataract    • Skin cancer     arm   • Stricture of esophagus    • Vitamin D deficiency    • Wears dentures     wears upper all the time   • Wears glasses     reading only     Past Surgical History:   Procedure Laterality Date   • CATARACT EXTRACTION W/ INTRAOCULAR LENS IMPLANT Right 02/14/2020    Procedure: REMOVE CATARACT AND IMPLANT INTRAOCULAR LENS;  Surgeon: Sylvain Brunner MD;  Location: [facility] OR;  Service: Ophthalmology;  Laterality: Right;   • CATARACT EXTRACTION W/ INTRAOCULAR LENS IMPLANT Left 10/02/2020    Procedure: REMOVE CATARACT AND IMPLANT INTRAOCULAR LENS;  Surgeon: Sylvain Brunner MD;  Location: [facility] OR;  Service: Ophthalmology;  Laterality: Left;   • CERVICAL CONIZATION  04/27/1966    chronic cervictitis,unresponding to cautery and conservative treatment   • COLONOSCOPY  02/16/1998    Colon endoscopy (Internal hemorrhoids.Ischemic colitis has now healed.No other colonic polyps are identified)   • COLONOSCOPY N/A 10/21/2020    Procedure: COLONOSCOPY;  Surgeon: Ollie Laguna DO;  Location: [facility] ENDOSCOPY;  Service: Gastroenterology;  Laterality: N/A;   • DILATATION AND CURETTAGE  08/28/1978    D&C (punch biopsy of the cervix and electrocautery of the cervix)   • ENDOSCOPY N/A 05/09/2018    Procedure: ESOPHAGOGASTRODUODENOSCOPY;  Surgeon: Ollie Laguna DO;  Location: [facility] ENDOSCOPY;  Service: Gastroenterology   • MAMMO BILATERAL  07/01/2016    SCREENING MAMMOGRAPHY DIGITAL  (Medicare) (Encounter for screening mammogram for malignant neoplasm of breast)    • OTHER SURGICAL HISTORY  02/28/2008    Hysteroscope procedure (Diagnostic hysteroscopy with fractional dilation and curettage and polypectomy.Postmenopausal bleeding)   • OTHER SURGICAL HISTORY  12/30/1997    Hysteroscope procedure (Hysteroscopic polypectomy, fractional D&C.Fluid in  endometrium and polypoid tissue per ultrasound)   • PAP SMEAR  03/24/2009    NORMAL   • THORACOTOMY  05/14/2008    Left,repair of diaphragmatic hernia,Jerri gastroplasty,placement of Marcaine infusion system(remainder of the procedure is detailed in 's operative note) large Hiatal hernia GERD,shortened esophagus ( greater than 5cm)   • TOOTH EXTRACTION  04/27/1966    Dental procedure (Removes teeth.Carious and unerupted teeth)   • TUBAL ABDOMINAL LIGATION  05/27/1977    anxiety,marked depression,reaction to birth control     Family History   Problem Relation Age of Onset   • Diabetes Other    • Hypertension Other    • Heart attack Other         REMARKABLE FOR MI   • Breast cancer Mother    • Cancer Mother    • Diabetes Mother    • Thyroid disease Mother    • Breast cancer Sister    • Cancer Sister    • Diabetes Sister    • Cancer Father    • Heart disease Father      Social History     Tobacco Use   • Smoking status: Never   • Smokeless tobacco: Never   Vaping Use   • Vaping Use: Never used   Substance Use Topics   • Alcohol use: No   • Drug use: No     Prior to Admission medications    Medication Sig Start Date End Date Taking? Authorizing Provider   albuterol (ACCUNEB) 1.25 MG/3ML nebulizer solution Take 3 mL by nebulization Every 6 (Six) Hours As Needed for Wheezing. 4/21/21  Yes Shaun Billingsley MD   albuterol sulfate  (90 Base) MCG/ACT inhaler INHALE 2 PUFFS EVERY 4 (FOUR) HOURS AS NEEDED FOR WHEEZING. 7/29/22  Yes Shaun Billingsley MD   Cholecalciferol (VITAMIN D-3 PO) Take 2,000 mg by mouth daily. 8/11/16  Yes Karina Kong MD   esomeprazole (nexIUM) 40 MG capsule Take 40 mg by mouth Every Morning Before Breakfast.   Yes Karina Kong MD   fluticasone (FLONASE) 50 MCG/ACT nasal spray 2 sprays into the nostril(s) as directed by provider Daily As Needed for Rhinitis.   Yes Karina Kong MD   Multiple Vitamins-Minerals (GNP HEALTHY EYES SUPERVISION PO) Take  by mouth 2 (Two)  Times a Day.   Yes Karina Kong MD   ondansetron (ZOFRAN) 4 MG tablet Take 4 mg by mouth Every 8 (Eight) Hours As Needed. for nausea 12/3/21  Yes Emergency, Nurse GLADYS King   Polyvinyl Alcohol-Povidone (REFRESH OP) Apply  to eye(s) as directed by provider As Needed.   Yes Karina Kong MD   simethicone (MYLICON,GAS-X) 125 MG capsule capsule Take 1 tablet by mouth Every 6 (Six) Hours. 12/3/21  Yes Emergency, Nurse GLADYS King   sucralfate (CARAFATE) 1 g tablet Take 1 g by mouth 3 (Three) Times a Day.   Yes Karina Kong MD   verapamil SR (CALAN-SR) 120 MG CR tablet Take 1 tablet by mouth Every Night. For bp 4/14/22  Yes Shaun Billingsley MD   aspirin 325 MG tablet Take 325 mg by mouth Daily. 1 tablet every day with lunch Oral 8/11/16   ProviderKarina MD     Allergies:  Hydrocodone-guaifenesin, Other, Clarithromycin, Erythromycin, Latex, Neomycin, and Sulfa antibiotics    I have reviewed the patients medical history, surgical history and family history in the available medical record system.     Current Medications:     Current Facility-Administered Medications   Medication Dose Route Frequency Provider Last Rate Last Admin   • dextrose 5 % and sodium chloride 0.45 % infusion  30 mL/hr Intravenous Continuous PRN Ollie Laguna DO 30 mL/hr at 12/15/22 1027 30 mL/hr at 12/15/22 1027       Objective     Physical Exam:   Temp:  [98.2 °F (36.8 °C)] 98.2 °F (36.8 °C)  Heart Rate:  [85] 85  Resp:  [18] 18  BP: (158)/(82) 158/82    Physical Exam:  General Appearance:    Alert, cooperative, in no acute distress   Head:    Normocephalic, without obvious abnormality, atraumatic   Eyes:            Lids and lashes normal, conjunctivae and sclerae normal, no icterus, no pallor, corneas clear, PERRLA   Ears:    Ears appear intact with no abnormalities noted   Throat:   No oral lesions, no thrush, oral mucosa moist   Neck:   No adenopathy, supple, trachea midline, no thyromegaly, no  carotid bruit, no JVD    Back:     No kyphosis present, no scoliosis present, no skin lesions,   erythema or scars, no tenderness to percussion or                 palpation,  range of motion normal   Lungs:     Clear to auscultation,respirations regular, even and         unlabored    Heart:    Regular rhythm and normal rate, normal S1 and S2, no  murmur, no gallop, no rub, no click   Breast Exam:    Deferred   Abdomen:     Normal bowel sounds, no masses, no organomegaly, soft  nontender, nondistended, no guarding, no rebound                 tenderness   Genitalia:    Deferred   Extremities:   Moves all extremities well, no edema, no cyanosis, no          redness   Pulses:   Pulses palpable and equal bilaterally   Skin:   No bleeding, bruising or rash   Lymph nodes:   No palpable adenopathy   Neurologic:   Cranial nerves 2 - 12 grossly intact, sensation intact, DTR     present and equal bilaterally      Results Review:     Lab Results   Component Value Date    WBC 7.16 04/14/2022    WBC 7.03 04/08/2021    WBC 7.02 05/14/2020    HGB 14.1 04/14/2022    HGB 13.5 04/08/2021    HGB 12.9 10/09/2020    HCT 40.4 04/14/2022    HCT 38.9 04/08/2021    HCT 37.1 10/09/2020     04/14/2022     04/08/2021     05/14/2020             No results found for: LIPASE  No results found for: INR  No results found for: THROATCX    Radiology Review:  Imaging Results (Last 72 Hours)     ** No results found for the last 72 hours. **           I reviewed the patient's new clinical results.  I reviewed the patient's new imaging results and agree with the interpretation.     ASSESSMENT/PLAN:   ASSESSMENT:  1.  Chest pain.  Colles' Niesen fundoplication  PLAN:  1.  Esophagogastroduodenoscopy with biopsy    Risk and benefits associated with the procedure are reviewed with the patient.  The patient wished to proceed     Ollie Laguna DO  12/15/22  10:41 CST

## 2022-12-15 NOTE — ANESTHESIA POSTPROCEDURE EVALUATION
Patient: Olivia Araujo    Procedure Summary     Date: 12/15/22 Room / Location: [facility] ENDOSCOPY 2 / [facility] ENDOSCOPY    Anesthesia Start: 1046 Anesthesia Stop: 1100    Procedure: ESOPHAGOGASTRODUODENOSCOPY 11:00 Diagnosis:       Chest pain, unspecified      (Chest pain, unspecified [R07.9])    Surgeons: Ollie Laguna DO Provider: Matthew Oneal CRNA    Anesthesia Type: general ASA Status: 3          Anesthesia Type: general    Vitals  No vitals data found for the desired time range.          Post Anesthesia Care and Evaluation    Patient location during evaluation: bedside  Patient participation: complete - patient participated  Level of consciousness: awake and awake and alert  Pain score: 0  Pain management: adequate    Airway patency: patent  Anesthetic complications: No anesthetic complications  PONV Status: none  Cardiovascular status: acceptable and stable  Respiratory status: acceptable, room air and spontaneous ventilation  Hydration status: acceptable    Comments: BP:  121/65  HR:  88  SAT:  97  RR:  16  TEMP: 97.0

## 2022-12-16 LAB — REF LAB TEST METHOD: NORMAL

## 2023-02-03 ENCOUNTER — OFFICE VISIT (OUTPATIENT)
Dept: CARDIOLOGY | Facility: CLINIC | Age: 78
End: 2023-02-03
Payer: MEDICARE

## 2023-02-03 VITALS
HEIGHT: 65 IN | SYSTOLIC BLOOD PRESSURE: 130 MMHG | WEIGHT: 143 LBS | OXYGEN SATURATION: 97 % | DIASTOLIC BLOOD PRESSURE: 78 MMHG | HEART RATE: 89 BPM | BODY MASS INDEX: 23.82 KG/M2

## 2023-02-03 DIAGNOSIS — I10 PRIMARY HYPERTENSION: Primary | ICD-10-CM

## 2023-02-03 DIAGNOSIS — R07.2 PRECORDIAL PAIN: ICD-10-CM

## 2023-02-03 PROBLEM — R07.9 CHEST PAIN, UNSPECIFIED: Status: RESOLVED | Noted: 2022-12-14 | Resolved: 2023-02-03

## 2023-02-03 LAB
QT INTERVAL: 388 MS
QTC INTERVAL: 472 MS

## 2023-02-03 PROCEDURE — 99204 OFFICE O/P NEW MOD 45 MIN: CPT | Performed by: INTERNAL MEDICINE

## 2023-02-03 PROCEDURE — 93000 ELECTROCARDIOGRAM COMPLETE: CPT | Performed by: INTERNAL MEDICINE

## 2023-02-03 NOTE — PROGRESS NOTES
Olivia Araujo  77 y.o. female    02/03/2023  1. Primary hypertension    2. Precordial pain        History of Present Illness:    77 years old patient today at [facility] problem of gastroesophageal reflux disease, history of hiatal hernia s/p surgery had previous stress test several years ago who referred for evaluation of chest discomfort predominantly epigastric area and some element of tenderness.  This is going on for more than 6-month or so.  EKG nonspecific ST-T wave changes in lateral lead and normal sinus rhythm.  The pain described as discomfort or pressure-like feeling and there is no aggravating or relieving factors but got worse after eating.  She is being managed with Carafate and omeprazole.  She is on aspirin.  She is a pain-free at the time of evaluation except upon palpation in epigastric area      SUBJECTIVE:    Allergies   Allergen Reactions   • Bethanechol Unknown (See Comments)   • Hydrocodone-Guaifenesin Nausea Only   • Metoclopramide Unknown (See Comments)   • Other Nausea And Vomiting     CODICLEAR DH   • Potassium Guaiacolsulfonate Unknown (See Comments)   • Clarithromycin Nausea And Vomiting   • Erythromycin Rash   • Latex Rash   • Neomycin Rash   • Sulfa Antibiotics Rash       Past Medical History:   Diagnosis Date   • Acute suppurative otitis media without spontaneous rupture of ear drum     LEFT   • Allergic rhinitis    • Anxiety    • Arthritis    • Asthma    • Astigmatism    • Bladder disorder     leaks   • Cataract    • Colitis    • Cortical senile cataract    • Dry eye    • Dysphagia     POST-OPERATIVE   • Encounter for breast cancer screening other than mammogram    • Essential hypertension     no bp meds now   • Fatigue    • Filamentary keratitis of left eye    • GERD (gastroesophageal reflux disease)    • Hiatal hernia    • High blood pressure    • History of mammography, diagnostic 10/02/2009    MAMMOGRAM BREAST DIAGNOSTIC LT 27522 (Probably benign findings-Recommend  continued follow-up at the time of the patient's regular screening mammogram)   • History of mammography, diagnostic 03/31/2009    MAMMOGRAM BREAST DIAGNOSTIC LT 24995 (Probably benign punctate calcifications identified .Short interval follow-up suggested.Follow-up in 6 mos)   • Hyperlipidemia    • Hyponatremia    • Irritable bowel syndrome    • Keratoconjunctivitis sicca (HCC)    • Labyrinthitis     CHRONIC   • Myopia    • Posterior subcapsular polar senile cataract    • Skin cancer     arm   • Stricture of esophagus    • Vitamin D deficiency    • Wears dentures     wears upper all the time   • Wears glasses     reading only       Past Surgical History:   Procedure Laterality Date   • CATARACT EXTRACTION W/ INTRAOCULAR LENS IMPLANT Right 02/14/2020    Procedure: REMOVE CATARACT AND IMPLANT INTRAOCULAR LENS;  Surgeon: Sylvain Brunner MD;  Location: [facility] OR;  Service: Ophthalmology;  Laterality: Right;   • CATARACT EXTRACTION W/ INTRAOCULAR LENS IMPLANT Left 10/02/2020    Procedure: REMOVE CATARACT AND IMPLANT INTRAOCULAR LENS;  Surgeon: Sylvain Brunner MD;  Location: [facility] OR;  Service: Ophthalmology;  Laterality: Left;   • CERVICAL CONIZATION  04/27/1966    chronic cervictitis,unresponding to cautery and conservative treatment   • COLONOSCOPY  02/16/1998    Colon endoscopy (Internal hemorrhoids.Ischemic colitis has now healed.No other colonic polyps are identified)   • COLONOSCOPY N/A 10/21/2020    Procedure: COLONOSCOPY;  Surgeon: Ollie Laguna DO;  Location: [facility] ENDOSCOPY;  Service: Gastroenterology;  Laterality: N/A;   • DILATATION AND CURETTAGE  08/28/1978    D&C (punch biopsy of the cervix and electrocautery of the cervix)   • ENDOSCOPY N/A 05/09/2018    Procedure: ESOPHAGOGASTRODUODENOSCOPY;  Surgeon: Ollie Laguna DO;  Location: [facility] ENDOSCOPY;  Service: Gastroenterology   • ENDOSCOPY N/A 12/15/2022    Procedure: ESOPHAGOGASTRODUODENOSCOPY 11:00;  Surgeon: Ollie Laguna  DO;  Location: [facility] ENDOSCOPY;  Service: Gastroenterology;  Laterality: N/A;   • MAMMO BILATERAL  07/01/2016    SCREENING MAMMOGRAPHY DIGITAL  (Medicare) (Encounter for screening mammogram for malignant neoplasm of breast)    • OTHER SURGICAL HISTORY  02/28/2008    Hysteroscope procedure (Diagnostic hysteroscopy with fractional dilation and curettage and polypectomy.Postmenopausal bleeding)   • OTHER SURGICAL HISTORY  12/30/1997    Hysteroscope procedure (Hysteroscopic polypectomy, fractional D&C.Fluid in endometrium and polypoid tissue per ultrasound)   • PAP SMEAR  03/24/2009    NORMAL   • THORACOTOMY  05/14/2008    Left,repair of diaphragmatic hernia,Jerri gastroplasty,placement of Marcaine infusion system(remainder of the procedure is detailed in 's operative note) large Hiatal hernia GERD,shortened esophagus ( greater than 5cm)   • TOOTH EXTRACTION  04/27/1966    Dental procedure (Removes teeth.Carious and unerupted teeth)   • TUBAL ABDOMINAL LIGATION  05/27/1977    anxiety,marked depression,reaction to birth control       Family History   Problem Relation Age of Onset   • Diabetes Other    • Hypertension Other    • Heart attack Other         REMARKABLE FOR MI   • Breast cancer Mother    • Cancer Mother    • Diabetes Mother    • Thyroid disease Mother    • Breast cancer Sister    • Cancer Sister    • Diabetes Sister    • Cancer Father    • Heart disease Father        Social History     Socioeconomic History   • Marital status:    Tobacco Use   • Smoking status: Never   • Smokeless tobacco: Never   Vaping Use   • Vaping Use: Never used   Substance and Sexual Activity   • Alcohol use: No   • Drug use: No   • Sexual activity: Defer       Current Outpatient Medications   Medication Sig Dispense Refill   • albuterol (ACCUNEB) 1.25 MG/3ML nebulizer solution Take 3 mL by nebulization Every 6 (Six) Hours As Needed for Wheezing. 60 each 12   • albuterol sulfate  (90 Base) MCG/ACT inhaler  INHALE 2 PUFFS EVERY 4 (FOUR) HOURS AS NEEDED FOR WHEEZING. 18 g 5   • aspirin 325 MG tablet Take 325 mg by mouth Daily. 1 tablet every day with lunch Oral     • Cholecalciferol (VITAMIN D-3 PO) Take 2,000 mg by mouth daily.     • esomeprazole (nexIUM) 40 MG capsule Take 40 mg by mouth Every Morning Before Breakfast.     • fluticasone (FLONASE) 50 MCG/ACT nasal spray 2 sprays into the nostril(s) as directed by provider Daily As Needed for Rhinitis.     • Multiple Vitamins-Minerals (GNP HEALTHY EYES SUPERVISION PO) Take  by mouth 2 (Two) Times a Day.     • ondansetron (ZOFRAN) 4 MG tablet Take 4 mg by mouth Every 8 (Eight) Hours As Needed. for nausea     • Polyvinyl Alcohol-Povidone (REFRESH OP) Apply  to eye(s) as directed by provider As Needed.     • simethicone (MYLICON,GAS-X) 125 MG capsule capsule Take 1 tablet by mouth Every 6 (Six) Hours.     • sucralfate (CARAFATE) 1 g tablet Take 1 g by mouth 3 (Three) Times a Day.     • verapamil SR (CALAN-SR) 120 MG CR tablet Take 1 tablet by mouth Every Night. For bp 90 tablet 3   • metoprolol tartrate (LOPRESSOR) 25 MG tablet Take one tablet the night before procedure and take 2 the morning of procedure 3 tablet 0     No current facility-administered medications for this visit.       Review of Systems:   Constitutional:  no change in exercise tolerance.  HENT: Denies any hearing loss, epistaxis  Eyes: No blurred  Respiratory:  Denies dyspnea with exertion,no cough or wheezing  Cardiovascular: See H&P  Gastrointestinal:  Denies change in bowel habits, dyspepsia, ulcer disease  Endocrine: Negative for cold intolerance, heat intolerance, polydipsia, polyphagia  Genitourinary: Negative.  Musculoskeletal: Denies any history of arthritic symptoms or back problems.   Skin:  Denies rashes or skin lesions.   Allergic/Immunologic: Negative.  Negative for environmental allergies  Neurological:  Denies any history of recurrent headaches   Hematological: No history of easy  "bruisability  Psychiatric/Behavioral: Denies any history of depression    OBJECTIVE:    /78 (BP Location: Left arm, Patient Position: Sitting, Cuff Size: Adult)   Pulse 89   Ht 165.1 cm (65\")   Wt 64.9 kg (143 lb)   SpO2 97%   BMI 23.80 kg/m²     Physical Exam:   Constitutional: Cooperative, alert and oriented, well-developed, well-nourished, in no acute distress.   Head: Normocephalic, conjunctive is pink, thyroid is nonpalpable, no jugular is distention  Cardiovascular regular rate/rhythm, no S3, no pericardial rub  Pulmonary/Chest: Chest positive bilateral, good air entry, no rales, no wheezing  Abdominal: Abdomen soft, bowel sounds normoactive  Musculoskeletal: No deformities, positive peripheral pulses  Neurological: No gross motor or sensory deficits noted, affect appropriate.   Skin: Warm and dry to the touch, no apparent skin lesions or masses noted.   Psychiatric: Normal mood and affect. Behavior is normal    Procedures    Lab Results   Component Value Date    WBC 7.16 04/14/2022    HGB 14.1 04/14/2022    HCT 40.4 04/14/2022    MCV 92.9 04/14/2022     04/14/2022     Lab Results   Component Value Date    GLUCOSE 88 04/14/2022    BUN 8 04/14/2022    CREATININE 0.94 04/14/2022    EGFRIFNONA 53 (L) 04/08/2021    EGFRIFAFRI 65 10/01/2019    BCR 8.5 04/14/2022    CO2 23.0 04/14/2022    CALCIUM 9.3 04/14/2022    ALBUMIN 4.60 04/14/2022    AST 17 04/14/2022    ALT 9 04/14/2022     Lab Results   Component Value Date    CHOL 204 (H) 04/14/2022     Lab Results   Component Value Date    TRIG 100 04/14/2022    TRIG 88 06/28/2016    TRIG 89 06/28/2016     Lab Results   Component Value Date    HDL 82 (H) 04/14/2022    HDL 63 06/28/2016    HDL 81 09/23/2015     No components found for: LDLCALC  Lab Results   Component Value Date     (H) 04/14/2022     06/01/2017     06/28/2016     No results found for: HDLLDLRATIO  No components found for: CHOLHDL  Lab Results   Component Value Date "    HGBA1C 5.6 01/04/2014     Lab Results   Component Value Date    TSH 2.66 06/28/2016           ASSESSMENT AND PLAN:  Precordial chest pain    77 years old patient physically active repeat heart rate with a history of chest discomfort sometimes epigastric area sometimes over the left precordium described as discomfort no associated or aggravating factors.  Symptoms get worse after eating.  She had a previous normal stress test. We will arrange CT coronary angiogram to see if this is a true cardiac or noncardiac pain procedure risk regarding CT coronary angiogram discussed with the patient understand willing to proceed forward.  We will arrange an echocardiogram to assess the biventricular    #2 hypertension     calcium channel blocker with good blood pressure     I spent 27  minutes caring for Olivia on this date of service. This time includes time spent by me of counseling/coordination of care as relates to the presenting problem and any ordered procedures/tests as outlined above.           This document has been electronically signed by Vijaya Elena MD on February 3, 2023 12:25 CST        Diagnoses and all orders for this visit:    1. Primary hypertension (Primary)  -     ECG 12 Lead    2. Precordial pain  -     CT Angiogram Coronary; Future    Other orders  -     metoprolol tartrate (LOPRESSOR) 25 MG tablet; Take one tablet the night before procedure and take 2 the morning of procedure  Dispense: 3 tablet; Refill: 0          Vijaya Elena MD  2/3/2023  12:20 CST

## 2023-02-18 ENCOUNTER — TRANSCRIBE ORDERS (OUTPATIENT)
Dept: GENERAL RADIOLOGY | Facility: HOSPITAL | Age: 78
End: 2023-02-18
Payer: MEDICARE

## 2023-02-18 DIAGNOSIS — R07.2 PRECORDIAL PAIN: Primary | ICD-10-CM

## 2023-02-24 ENCOUNTER — LAB (OUTPATIENT)
Dept: LAB | Facility: HOSPITAL | Age: 78
End: 2023-02-24
Payer: MEDICARE

## 2023-02-24 ENCOUNTER — HOSPITAL ENCOUNTER (OUTPATIENT)
Dept: CT IMAGING | Facility: HOSPITAL | Age: 78
Discharge: HOME | End: 2023-02-24
Payer: MEDICARE

## 2023-02-24 DIAGNOSIS — R07.2 PRECORDIAL PAIN: ICD-10-CM

## 2023-02-24 LAB
BUN SERPL-MCNC: 12 MG/DL (ref 8–23)
CREAT SERPL-MCNC: 0.83 MG/DL (ref 0.57–1)
EGFRCR SERPLBLD CKD-EPI 2021: 72.3 ML/MIN/1.73

## 2023-02-24 PROCEDURE — 82565 ASSAY OF CREATININE: CPT

## 2023-02-24 PROCEDURE — 36415 COLL VENOUS BLD VENIPUNCTURE: CPT

## 2023-02-24 PROCEDURE — 84520 ASSAY OF UREA NITROGEN: CPT

## 2023-02-24 PROCEDURE — 75574 CT ANGIO HRT W/3D IMAGE: CPT

## 2023-02-24 PROCEDURE — 25510000001 IOPAMIDOL PER 1 ML: Performed by: INTERNAL MEDICINE

## 2023-02-24 RX ADMIN — IOPAMIDOL 90 ML: 755 INJECTION, SOLUTION INTRAVENOUS at 08:06

## 2023-02-27 ENCOUNTER — TELEPHONE (OUTPATIENT)
Dept: CARDIOLOGY | Facility: CLINIC | Age: 78
End: 2023-02-27
Payer: MEDICARE

## 2023-02-27 DIAGNOSIS — R07.2 PRECORDIAL PAIN: Primary | ICD-10-CM

## 2023-02-27 RX ORDER — SODIUM CHLORIDE 9 MG/ML
50 INJECTION, SOLUTION INTRAVENOUS CONTINUOUS
Status: CANCELLED | OUTPATIENT
Start: 2023-03-07

## 2023-02-27 RX ORDER — SODIUM CHLORIDE 0.9 % (FLUSH) 0.9 %
3 SYRINGE (ML) INJECTION EVERY 12 HOURS SCHEDULED
Status: CANCELLED | OUTPATIENT
Start: 2023-03-07

## 2023-02-27 RX ORDER — ASPIRIN 81 MG/1
81 TABLET ORAL DAILY
Status: CANCELLED | OUTPATIENT
Start: 2023-03-07

## 2023-02-27 RX ORDER — SODIUM CHLORIDE 0.9 % (FLUSH) 0.9 %
10 SYRINGE (ML) INJECTION AS NEEDED
Status: CANCELLED | OUTPATIENT
Start: 2023-03-07

## 2023-02-27 RX ORDER — ASPIRIN 81 MG/1
81 TABLET, CHEWABLE ORAL ONCE
Status: CANCELLED | OUTPATIENT
Start: 2023-03-07 | End: 2023-02-27

## 2023-02-27 NOTE — TELEPHONE ENCOUNTER
Patient is scheduled for [facility] on 3/7/23. She may have a light breakfast by 6 am. All instructions were discussed with the patient.

## 2023-03-07 ENCOUNTER — HOSPITAL ENCOUNTER (OUTPATIENT)
Facility: HOSPITAL | Age: 78
Setting detail: HOSPITAL OUTPATIENT SURGERY
Discharge: HOME | End: 2023-03-07
Attending: INTERNAL MEDICINE | Admitting: INTERNAL MEDICINE
Payer: MEDICARE

## 2023-03-07 ENCOUNTER — HOSPITAL ENCOUNTER (OUTPATIENT)
Dept: CARDIOLOGY | Facility: HOSPITAL | Age: 78
Discharge: HOME | End: 2023-03-07
Payer: MEDICARE

## 2023-03-07 VITALS
HEIGHT: 65 IN | DIASTOLIC BLOOD PRESSURE: 65 MMHG | TEMPERATURE: 97 F | WEIGHT: 146 LBS | SYSTOLIC BLOOD PRESSURE: 143 MMHG | RESPIRATION RATE: 18 BRPM | OXYGEN SATURATION: 96 % | BODY MASS INDEX: 24.32 KG/M2 | HEART RATE: 80 BPM

## 2023-03-07 DIAGNOSIS — R07.2 PRECORDIAL PAIN: ICD-10-CM

## 2023-03-07 DIAGNOSIS — I10 PRIMARY HYPERTENSION: Primary | ICD-10-CM

## 2023-03-07 DIAGNOSIS — I10 PRIMARY HYPERTENSION: ICD-10-CM

## 2023-03-07 LAB
ANION GAP SERPL CALCULATED.3IONS-SCNC: 8 MMOL/L (ref 5–15)
BH CV ECHO LEFT VENTRICLE GLOBAL LONGITUDINAL STRAIN: -16.5 %
BH CV ECHO MEAS - ACS: 1.41 CM
BH CV ECHO MEAS - AO MAX PG: 5.5 MMHG
BH CV ECHO MEAS - AO MEAN PG: 3.5 MMHG
BH CV ECHO MEAS - AO ROOT DIAM: 3.4 CM
BH CV ECHO MEAS - AO V2 MAX: 117.3 CM/SEC
BH CV ECHO MEAS - AO V2 VTI: 29.7 CM
BH CV ECHO MEAS - AVA(I,D): 1.84 CM2
BH CV ECHO MEAS - EDV(CUBED): 36.6 ML
BH CV ECHO MEAS - EDV(MOD-SP2): 42.6 ML
BH CV ECHO MEAS - EDV(MOD-SP4): 53.2 ML
BH CV ECHO MEAS - EF(MOD-BP): 63.4 %
BH CV ECHO MEAS - EF(MOD-SP2): 60.1 %
BH CV ECHO MEAS - EF(MOD-SP4): 65.4 %
BH CV ECHO MEAS - ESV(CUBED): 11.9 ML
BH CV ECHO MEAS - ESV(MOD-SP2): 17 ML
BH CV ECHO MEAS - ESV(MOD-SP4): 18.4 ML
BH CV ECHO MEAS - FS: 31.2 %
BH CV ECHO MEAS - IVS/LVPW: 1.15 CM
BH CV ECHO MEAS - IVSD: 1.11 CM
BH CV ECHO MEAS - LA DIMENSION: 3.4 CM
BH CV ECHO MEAS - LAT PEAK E' VEL: 7.8 CM/SEC
BH CV ECHO MEAS - LV DIASTOLIC VOL/BSA (35-75): 31 CM2
BH CV ECHO MEAS - LV MASS(C)D: 101.1 GRAMS
BH CV ECHO MEAS - LV MAX PG: 2.3 MMHG
BH CV ECHO MEAS - LV MEAN PG: 1.34 MMHG
BH CV ECHO MEAS - LV SYSTOLIC VOL/BSA (12-30): 10.7 CM2
BH CV ECHO MEAS - LV V1 MAX: 75.8 CM/SEC
BH CV ECHO MEAS - LV V1 VTI: 19.3 CM
BH CV ECHO MEAS - LVIDD: 3.3 CM
BH CV ECHO MEAS - LVIDS: 2.29 CM
BH CV ECHO MEAS - LVOT AREA: 2.8 CM2
BH CV ECHO MEAS - LVOT DIAM: 1.9 CM
BH CV ECHO MEAS - LVPWD: 0.97 CM
BH CV ECHO MEAS - MED PEAK E' VEL: 5.5 CM/SEC
BH CV ECHO MEAS - MR MAX PG: 131.5 MMHG
BH CV ECHO MEAS - MR MAX VEL: 573.4 CM/SEC
BH CV ECHO MEAS - MV A MAX VEL: 81.8 CM/SEC
BH CV ECHO MEAS - MV DEC SLOPE: 512.1 CM/SEC2
BH CV ECHO MEAS - MV E MAX VEL: 95.5 CM/SEC
BH CV ECHO MEAS - MV E/A: 1.17
BH CV ECHO MEAS - MV MAX PG: 2.5 MMHG
BH CV ECHO MEAS - MV MEAN PG: 1.63 MMHG
BH CV ECHO MEAS - MV P1/2T: 43.5 MSEC
BH CV ECHO MEAS - MV V2 VTI: 21.6 CM
BH CV ECHO MEAS - MVA(P1/2T): 5.1 CM2
BH CV ECHO MEAS - MVA(VTI): 2.5 CM2
BH CV ECHO MEAS - PA V2 MAX: 91.1 CM/SEC
BH CV ECHO MEAS - RAP SYSTOLE: 3 MMHG
BH CV ECHO MEAS - RVDD: 2.44 CM
BH CV ECHO MEAS - RVSP: 29.4 MMHG
BH CV ECHO MEAS - SI(MOD-SP2): 14.9 ML/M2
BH CV ECHO MEAS - SI(MOD-SP4): 20.3 ML/M2
BH CV ECHO MEAS - SV(LVOT): 54.8 ML
BH CV ECHO MEAS - SV(MOD-SP2): 25.6 ML
BH CV ECHO MEAS - SV(MOD-SP4): 34.8 ML
BH CV ECHO MEAS - TAPSE (>1.6): 1.61 CM
BH CV ECHO MEAS - TR MAX PG: 26.4 MMHG
BH CV ECHO MEAS - TR MAX VEL: 257 CM/SEC
BH CV ECHO MEASUREMENTS AVERAGE E/E' RATIO: 14.36
BUN SERPL-MCNC: 10 MG/DL (ref 8–23)
BUN/CREAT SERPL: 13.9 (ref 7–25)
CALCIUM SPEC-SCNC: 8.9 MG/DL (ref 8.6–10.5)
CHLORIDE SERPL-SCNC: 103 MMOL/L (ref 98–107)
CO2 SERPL-SCNC: 27 MMOL/L (ref 22–29)
CREAT SERPL-MCNC: 0.72 MG/DL (ref 0.57–1)
DEPRECATED RDW RBC AUTO: 43.3 FL (ref 37–54)
EGFRCR SERPLBLD CKD-EPI 2021: 85.7 ML/MIN/1.73
ERYTHROCYTE [DISTWIDTH] IN BLOOD BY AUTOMATED COUNT: 12.4 % (ref 12.3–15.4)
GLUCOSE SERPL-MCNC: 87 MG/DL (ref 65–99)
HCT VFR BLD AUTO: 36.8 % (ref 34–46.6)
HGB BLD-MCNC: 12.3 G/DL (ref 12–15.9)
INR PPP: 1.04 (ref 0.8–1.2)
LEFT ATRIUM VOLUME INDEX: 21.6 ML/M2
MAXIMAL PREDICTED HEART RATE: 142 BPM
MCH RBC QN AUTO: 31.8 PG (ref 26.6–33)
MCHC RBC AUTO-ENTMCNC: 33.4 G/DL (ref 31.5–35.7)
MCV RBC AUTO: 95.1 FL (ref 79–97)
PLATELET # BLD AUTO: 246 10*3/MM3 (ref 140–450)
PMV BLD AUTO: 9.2 FL (ref 6–12)
POTASSIUM SERPL-SCNC: 4 MMOL/L (ref 3.5–5.2)
PROTHROMBIN TIME: 13.5 SECONDS (ref 11.1–15.3)
RBC # BLD AUTO: 3.87 10*6/MM3 (ref 3.77–5.28)
SODIUM SERPL-SCNC: 138 MMOL/L (ref 136–145)
STJ: 2.7 CM
STRESS TARGET HR: 121 BPM
WBC NRBC COR # BLD: 5.66 10*3/MM3 (ref 3.4–10.8)

## 2023-03-07 PROCEDURE — S0260 H&P FOR SURGERY: HCPCS | Performed by: INTERNAL MEDICINE

## 2023-03-07 PROCEDURE — 93458 L HRT ARTERY/VENTRICLE ANGIO: CPT | Performed by: INTERNAL MEDICINE

## 2023-03-07 PROCEDURE — 99152 MOD SED SAME PHYS/QHP 5/>YRS: CPT | Performed by: INTERNAL MEDICINE

## 2023-03-07 PROCEDURE — 93356 MYOCRD STRAIN IMG SPCKL TRCK: CPT | Performed by: INTERNAL MEDICINE

## 2023-03-07 PROCEDURE — 25010000002 HEPARIN (PORCINE) PER 1000 UNITS: Performed by: INTERNAL MEDICINE

## 2023-03-07 PROCEDURE — 85027 COMPLETE CBC AUTOMATED: CPT | Performed by: INTERNAL MEDICINE

## 2023-03-07 PROCEDURE — 93306 TTE W/DOPPLER COMPLETE: CPT

## 2023-03-07 PROCEDURE — 25010000002 MIDAZOLAM PER 1 MG: Performed by: INTERNAL MEDICINE

## 2023-03-07 PROCEDURE — 85610 PROTHROMBIN TIME: CPT | Performed by: INTERNAL MEDICINE

## 2023-03-07 PROCEDURE — 93306 TTE W/DOPPLER COMPLETE: CPT | Performed by: INTERNAL MEDICINE

## 2023-03-07 PROCEDURE — 93356 MYOCRD STRAIN IMG SPCKL TRCK: CPT

## 2023-03-07 PROCEDURE — 80048 BASIC METABOLIC PNL TOTAL CA: CPT | Performed by: INTERNAL MEDICINE

## 2023-03-07 PROCEDURE — 25010000002 FENTANYL CITRATE (PF) 50 MCG/ML SOLUTION: Performed by: INTERNAL MEDICINE

## 2023-03-07 PROCEDURE — 25510000001 IOPAMIDOL PER 1 ML: Performed by: INTERNAL MEDICINE

## 2023-03-07 PROCEDURE — C1894 INTRO/SHEATH, NON-LASER: HCPCS | Performed by: INTERNAL MEDICINE

## 2023-03-07 PROCEDURE — C1769 GUIDE WIRE: HCPCS | Performed by: INTERNAL MEDICINE

## 2023-03-07 RX ORDER — SODIUM CHLORIDE 9 MG/ML
125 INJECTION, SOLUTION INTRAVENOUS CONTINUOUS
Status: DISCONTINUED | OUTPATIENT
Start: 2023-03-07 | End: 2023-03-07 | Stop reason: HOSPADM

## 2023-03-07 RX ORDER — ACETAMINOPHEN 325 MG/1
650 TABLET ORAL EVERY 4 HOURS PRN
Status: DISCONTINUED | OUTPATIENT
Start: 2023-03-07 | End: 2023-03-07 | Stop reason: HOSPADM

## 2023-03-07 RX ORDER — SODIUM CHLORIDE 0.9 % (FLUSH) 0.9 %
10 SYRINGE (ML) INJECTION AS NEEDED
Status: DISCONTINUED | OUTPATIENT
Start: 2023-03-07 | End: 2023-03-07 | Stop reason: HOSPADM

## 2023-03-07 RX ORDER — ASPIRIN 81 MG/1
81 TABLET, CHEWABLE ORAL ONCE
Status: DISCONTINUED | OUTPATIENT
Start: 2023-03-07 | End: 2023-03-07 | Stop reason: HOSPADM

## 2023-03-07 RX ORDER — FENTANYL CITRATE 50 UG/ML
INJECTION, SOLUTION INTRAMUSCULAR; INTRAVENOUS
Status: DISCONTINUED | OUTPATIENT
Start: 2023-03-07 | End: 2023-03-07 | Stop reason: HOSPADM

## 2023-03-07 RX ORDER — NITROGLYCERIN 5 MG/ML
INJECTION, SOLUTION INTRAVENOUS
Status: DISCONTINUED | OUTPATIENT
Start: 2023-03-07 | End: 2023-03-07 | Stop reason: HOSPADM

## 2023-03-07 RX ORDER — MIDAZOLAM HYDROCHLORIDE 1 MG/ML
INJECTION INTRAMUSCULAR; INTRAVENOUS
Status: DISCONTINUED | OUTPATIENT
Start: 2023-03-07 | End: 2023-03-07 | Stop reason: HOSPADM

## 2023-03-07 RX ORDER — SODIUM CHLORIDE 9 MG/ML
50 INJECTION, SOLUTION INTRAVENOUS CONTINUOUS
Status: DISCONTINUED | OUTPATIENT
Start: 2023-03-07 | End: 2023-03-07 | Stop reason: HOSPADM

## 2023-03-07 RX ORDER — RANOLAZINE 500 MG/1
500 TABLET, EXTENDED RELEASE ORAL 2 TIMES DAILY
Qty: 60 TABLET | Refills: 3 | Status: SHIPPED | OUTPATIENT
Start: 2023-03-07

## 2023-03-07 RX ORDER — SODIUM CHLORIDE 0.9 % (FLUSH) 0.9 %
3 SYRINGE (ML) INJECTION EVERY 12 HOURS SCHEDULED
Status: DISCONTINUED | OUTPATIENT
Start: 2023-03-07 | End: 2023-03-07 | Stop reason: HOSPADM

## 2023-03-07 RX ORDER — ASPIRIN 81 MG/1
81 TABLET ORAL DAILY
Status: DISCONTINUED | OUTPATIENT
Start: 2023-03-08 | End: 2023-03-07 | Stop reason: HOSPADM

## 2023-03-07 RX ORDER — HEPARIN SODIUM 1000 [USP'U]/ML
INJECTION, SOLUTION INTRAVENOUS; SUBCUTANEOUS
Status: DISCONTINUED | OUTPATIENT
Start: 2023-03-07 | End: 2023-03-07 | Stop reason: HOSPADM

## 2023-03-07 RX ORDER — ISOSORBIDE MONONITRATE 30 MG/1
30 TABLET, EXTENDED RELEASE ORAL EVERY MORNING
Qty: 90 TABLET | Refills: 3 | Status: SHIPPED | OUTPATIENT
Start: 2023-03-07

## 2023-03-07 RX ADMIN — SODIUM CHLORIDE 50 ML/HR: 9 INJECTION, SOLUTION INTRAVENOUS at 12:27

## 2023-03-07 NOTE — H&P
[facility] Cardiology  HISTORY AND PHYSICAL  Olivia Araujo  77 y.o. female    Chief complaint -  Chest pain    History of Present Illness:  This is a 77-year-old female with history of hypertension, GERD, hiatal hernia status post surgery who was recently seen in office by Dr. Elena for consideration for chest pain.  EF was borderline normal at 50%.  She underwent a CTA coronary angiogram which was concerning for significant disease in proximal LAD.  Also potential significant stenosis in PDA.  Patient has been on appropriate medical therapy with verapamil, metoprolol, aspirin.  In the setting of recurrent chest pain despite being on medical therapy    Patient is here for cardiac cath for further evaluation.      ADDITIONAL FINDINGS:  The aortic valve is tricuspid.   There is no myocardial bridging.   On short axis views, the myocardium is homogeneous in thickness.  A 3 mm left upper lobe nodule seen on axial image 4.  Postoperative changes in the gastroesophageal junction.     IMPRESSION:  CONCLUSION:   LAD: Patent. Contains a calcified and noncalcified plaque in the  proximal aspect resulting in 50-79% stenosis. Just distal to this  calcification in the proximal LAD, there is a focus of more  moderate (50-69% stenosis due to a noncalcified plaque. Moderate  sized calcified plaques in the mid aspect resulting in  approximately 50% stenosis. Moderate sized noncalcified plaque in  the mid aspect resulting in approximately 50% stenosis.  PDA: Contains several relatively large calcifications for the  small size of the vessel which could be resulting in severe  stenosis but not adequately seen due to small vessel size.  Extensive atherosclerotic disease with a calcium score of 583.  Ejection fraction 50%.    Allergies   Allergen Reactions   • Bethanechol Unknown (See Comments)   • Hydrocodone-Guaifenesin Nausea Only   • Metoclopramide Unknown (See Comments)   • Other Nausea And Vomiting      CODICLEAR DH   • Potassium Guaiacolsulfonate Unknown (See Comments)   • Clarithromycin Nausea And Vomiting   • Erythromycin Rash   • Latex Rash   • Neomycin Rash   • Sulfa Antibiotics Rash         Past Medical History:   Diagnosis Date   • Acute suppurative otitis media without spontaneous rupture of ear drum     LEFT   • Allergic rhinitis    • Anxiety    • Arthritis    • Asthma    • Astigmatism    • Bladder disorder     leaks   • Cataract    • Colitis    • Cortical senile cataract    • Dry eye    • Dysphagia     POST-OPERATIVE   • Encounter for breast cancer screening other than mammogram    • Essential hypertension     no bp meds now   • Fatigue    • Filamentary keratitis of left eye    • GERD (gastroesophageal reflux disease)    • Hiatal hernia    • High blood pressure    • History of mammography, diagnostic 10/02/2009    [place] [address] (Probably benign findings-Recommend continued follow-up at the time of the patient's regular screening mammogram)   • History of mammography, diagnostic 03/31/2009    MAMMOGRAM BREAST DIAGNOSTIC LT 16951 (Probably benign punctate calcifications identified .Short interval follow-up suggested.Follow-up in 6 mos)   • Hyperlipidemia    • Hyponatremia    • Irritable bowel syndrome    • Keratoconjunctivitis sicca (HCC)    • Labyrinthitis     CHRONIC   • Myopia    • Posterior subcapsular polar senile cataract    • Skin cancer     arm   • Stricture of esophagus    • Vitamin D deficiency    • Wears dentures     wears upper all the time   • Wears glasses     reading only         Past Surgical History:   Procedure Laterality Date   • CATARACT EXTRACTION W/ INTRAOCULAR LENS IMPLANT Right 02/14/2020    Procedure: REMOVE CATARACT AND IMPLANT INTRAOCULAR LENS;  Surgeon: Sylvain Brunner MD;  Location: [facility];  Service: Ophthalmology;  Laterality: Right;   • CATARACT EXTRACTION W/ INTRAOCULAR LENS IMPLANT Left 10/02/2020    Procedure: REMOVE CATARACT AND IMPLANT  INTRAOCULAR LENS;  Surgeon: Sylvain Brunner MD;  Location: [facility] OR;  Service: Ophthalmology;  Laterality: Left;   • CERVICAL CONIZATION  04/27/1966    chronic cervictitis,unresponding to cautery and conservative treatment   • COLONOSCOPY  02/16/1998    Colon endoscopy (Internal hemorrhoids.Ischemic colitis has now healed.No other colonic polyps are identified)   • COLONOSCOPY N/A 10/21/2020    Procedure: COLONOSCOPY;  Surgeon: Ollie Laguna DO;  Location: [facility] ENDOSCOPY;  Service: Gastroenterology;  Laterality: N/A;   • DILATATION AND CURETTAGE  08/28/1978    D&C (punch biopsy of the cervix and electrocautery of the cervix)   • ENDOSCOPY N/A 05/09/2018    Procedure: ESOPHAGOGASTRODUODENOSCOPY;  Surgeon: Ollie Laguna DO;  Location: [facility] ENDOSCOPY;  Service: Gastroenterology   • ENDOSCOPY N/A 12/15/2022    Procedure: ESOPHAGOGASTRODUODENOSCOPY 11:00;  Surgeon: Ollie Laguna DO;  Location: [facility] ENDOSCOPY;  Service: Gastroenterology;  Laterality: N/A;   • MAMMO BILATERAL  07/01/2016    SCREENING MAMMOGRAPHY DIGITAL  (Medicare) (Encounter for screening mammogram for malignant neoplasm of breast)    • OTHER SURGICAL HISTORY  02/28/2008    Hysteroscope procedure (Diagnostic hysteroscopy with fractional dilation and curettage and polypectomy.Postmenopausal bleeding)   • OTHER SURGICAL HISTORY  12/30/1997    Hysteroscope procedure (Hysteroscopic polypectomy, fractional D&C.Fluid in endometrium and polypoid tissue per ultrasound)   • PAP SMEAR  03/24/2009    NORMAL   • THORACOTOMY  05/14/2008    Left,repair of diaphragmatic hernia,Jerri gastroplasty,placement of Marcaine infusion system(remainder of the procedure is detailed in 's operative note) large Hiatal hernia GERD,shortened esophagus ( greater than 5cm)   • TOOTH EXTRACTION  04/27/1966    Dental procedure (Removes teeth.Carious and unerupted teeth)   • TUBAL ABDOMINAL LIGATION  05/27/1977    anxiety,marked  depression,reaction to birth control         Family History   Problem Relation Age of Onset   • Diabetes Other    • Hypertension Other    • Heart attack Other         REMARKABLE FOR MI   • Breast cancer Mother    • Cancer Mother    • Diabetes Mother    • Thyroid disease Mother    • Breast cancer Sister    • Cancer Sister    • Diabetes Sister    • Cancer Father    • Heart disease Father          Social History     Socioeconomic History   • Marital status:    Tobacco Use   • Smoking status: Never   • Smokeless tobacco: Never   Vaping Use   • Vaping Use: Never used   Substance and Sexual Activity   • Alcohol use: No   • Drug use: No   • Sexual activity: Defer         Prior to Admission medications    Medication Sig Start Date End Date Taking? Authorizing Provider   albuterol (ACCUNEB) 1.25 MG/3ML nebulizer solution Take 3 mL by nebulization Every 6 (Six) Hours As Needed for Wheezing. 4/21/21   Shaun Billingsley MD   albuterol sulfate  (90 Base) MCG/ACT inhaler INHALE 2 PUFFS EVERY 4 (FOUR) HOURS AS NEEDED FOR WHEEZING. 7/29/22   Shaun Billingsley MD   aspirin 325 MG tablet Take 325 mg by mouth Daily. 1 tablet every day with lunch Oral 8/11/16   Karina Kong MD   Cholecalciferol (VITAMIN D-3 PO) Take 2,000 mg by mouth daily. 8/11/16   Karina Kong MD   esomeprazole (nexIUM) 40 MG capsule Take 40 mg by mouth Every Morning Before Breakfast.    ProviderKarina MD   fluticasone (FLONASE) 50 MCG/ACT nasal spray 2 sprays into the nostril(s) as directed by provider Daily As Needed for Rhinitis.    ProviderKarina MD   metoprolol tartrate (LOPRESSOR) 25 MG tablet Take one tablet the night before procedure and take 2 the morning of procedure 2/23/23   Vijaya Elena MD   Multiple Vitamins-Minerals (GNP HEALTHY EYES SUPERVISION PO) Take  by mouth 2 (Two) Times a Day.    ProviderKarina MD   ondansetron (ZOFRAN) 4 MG tablet Take 4 mg by mouth Every 8 (Eight) Hours As Needed. for nausea  "12/3/21   Emergency, Nurse Fernando RN   Polyvinyl Alcohol-Povidone (REFRESH OP) Apply  to eye(s) as directed by provider As Needed.    Provider, MD Karina   simethicone (MYLICON,GAS-X) 125 MG capsule capsule Take 1 tablet by mouth Every 6 (Six) Hours. 12/3/21   Emergency, Nurse Fernando RN   sucralfate (CARAFATE) 1 g tablet Take 1 g by mouth 3 (Three) Times a Day.    Provider, MD Karina   verapamil SR (CALAN-SR) 120 MG CR tablet Take 1 tablet by mouth Every Night. For bp 4/14/22   Shaun Billingsley MD         Review of Systems:     Constitution: Denies any fatigue, fever or chills.  HENT: Denies any headache, hearing impairment.  Eyes: Denies any blurring of vision, or photophobia.  Cardiovascular:  As per history of present illness.   Respiratory system: Denies any COPD, shortness of breath.  Endocrine:  No history of hyperlipidemia, diabetes.  Musculoskeletal:  No history of arthritis with musculoskeletal problems.  Gastrointestinal: No nausea, vomiting, or melena.  Genitourinary: No dysuria or hematuria.  Neurological: No history of seizure disorder, stroke, or memory problems.    Psychiatric/Behavioral: No history of depression, bipolar disorder or schizophrenia .    Hematological: No history of easy bruising.    ROS          OBJECTIVE:    /80   Pulse 72   Temp 97.8 °F (36.6 °C) (Temporal)   Resp 18   Ht 165.1 cm (65\")   Wt 66.2 kg (146 lb)   SpO2 98%   BMI 24.30 kg/m²       Physical Exam:   Constitutional: Patient is oriented to person, place, and time.   Skin: Warm and dry.  Well developed and nourished in no acute distress .  Head: Normocephalic and atraumatic.   Eyes: Pupils are equal, round, and reactive to light.   Neck: Neck supple. No bruit in the carotids, no elevation of JVD.  Cardiovascular: [place] in the fifth intercostal space, regular rate, and rhythm,  S1 greater than S2, no S3 or S4, no gallop.  Pulmonary/Chest: Air entry is equal on both sides.  No wheezing or crackles.  Abdominal: " Soft.  No hepatosplenomegaly, bowel sounds are present.  Musculoskeletal: No kyphoscoliosis.  Neurological: Alert and oriented to person, place, and time.  Cranial nerves are intact. No motor or sensory deficit.  Extremities: No edema, no radial femoral delay.  Psychiatric: Normal mood and affect. Behavior is normal.      Lab Results (last 24 hours)     Procedure Component Value Units Date/Time    CBC (No Diff) [[id number]]  (Normal) Collected: 03/07/23 1229    Specimen: Blood Updated: 03/07/23 1235     WBC 5.66 10*3/mm3      RBC 3.87 10*6/mm3      Hemoglobin 12.3 g/dL      Hematocrit 36.8 %      MCV 95.1 fL      MCH 31.8 pg      MCHC 33.4 g/dL      RDW 12.4 %      RDW-SD 43.3 fl      MPV 9.2 fL      Platelets 246 10*3/mm3     Basic Metabolic Panel [[id number]]  (Normal) Collected: 03/07/23 1229    Specimen: Blood Updated: 03/07/23 1255     Glucose 87 mg/dL      BUN 10 mg/dL      Creatinine 0.72 mg/dL      Sodium 138 mmol/L      Potassium 4.0 mmol/L      Chloride 103 mmol/L      CO2 27.0 mmol/L      Calcium 8.9 mg/dL      BUN/Creatinine Ratio 13.9     Anion Gap 8.0 mmol/L      eGFR 85.7 mL/min/1.73     Narrative:      GFR Normal >60  Chronic Kidney Disease <60  Kidney Failure <15    The GFR formula is only valid for adults with stable renal function between ages 18 and 70.    Protime-INR [[id number]]  (Normal) Collected: 03/07/23 1229    Specimen: Blood Updated: 03/07/23 1308     Protime 13.5 Seconds      INR 1.04    Narrative:      Therapeutic range for most indications is 2.0-3.0 INR,  or 2.5-3.5 for mechanical heart valves.              The 10-year ASCVD risk score (Triston DK, et al., 2019) is: 48%    Values used to calculate the score:      Age: 77 years      Sex: Female      Is Non- : No      Diabetic: No      Tobacco smoker: No      Systolic Blood Pressure: 174 mmHg      Is BP treated: Yes      HDL Cholesterol: 82 mg/dL      Total Cholesterol: 204 mg/dL          A/P:    [facility]  Pre-Op:    Invasive coronary angiography was recommended to the patient.  The patientdenies  bleeding issues. The patient reports use of antiplatelet agents.  The patient denies  CKD. The patient denies  contrast allergy. The patient denies  use of diabetes medications.    Pre-Cath Surgical History: NA    The indications, risks/benefits and alternatives of diagnostic left heart cardiac catheterization, angiography, conscious sedation, and possible blood transfusion were discussed in detail with the patient. The potential complications of 1/2000 chance of death, 1/1000 chance of heart attack or stroke, 1/500 chance of bleeding or clotting of the femoral artery, and 1/500 chance of allergic reaction to contrast were discussed. We also reviewed possible complications of infection and kidney dysfunction. If PCI were performed and intra-coronary stents indicated, we discussed the details about JESUS. This included a review of the risks of the infrequent, but relatively higher incidence of late thrombosis with JESUS. The importance of maintaining a consistent daily regimen of aspirin and an additional anti-platelet agent for as long as directed after implantation was emphasized. No contraindications were found. The patient  appeared to understand and agreed to the above.  -Left heart catheterization, Coronary angiography, Graft angiography, In-situ LIMA angiography, Left ventriculography, Intravascular ultrasound, Optical coherence tomography, Flow wire, Balloon Angioplasty, Coronary stent, Graft stent, Aortography, Renal angiography, Iliofemoral angiography, Device closure, femoral artery, Intra-aortic balloon pump, Impella LV assist device implantation, Transvenous pacemaker, Pericardiocentesis and Subclavian angiography      ASA Class: III  Mallampati Score: II      Contraindications to DAPT: None        BMI is within normal parameters. No other follow-up for BMI required.      Olivia Araujo  reports that she has never  smoked. She has never used smokeless tobacco.. Malcom Banerjee MD  3/7/2023  15:04 CST      Part of this note may be an electronic transcription/translation of spoken language to printed text using the Dragon Dictation System.

## 2023-03-08 ENCOUNTER — TELEPHONE (OUTPATIENT)
Dept: CARDIOLOGY | Facility: CLINIC | Age: 78
End: 2023-03-08
Payer: MEDICARE

## 2023-03-08 NOTE — TELEPHONE ENCOUNTER
Contacted patient to go over echo results. Per Machelle Ultrasound of heart showing normal heart function, grade 2 diastolic dysfunction ( there are 4 grades), usually this is due to high blood pressure. Mild mitral valve regurgitation/leakiness.  Dr. Banerjee recently changed pills. No medication or treatment changes at this time. Continue everything as is. She was understanding.     ----- Message from JOAN Rivera sent at 3/8/2023  3:03 PM CST -----  Ultrasound of heart showing normal heart function, grade 2 diastolic dysfunction ( there are 4 grades), usually this is due to high blood pressure. Mild mitral valve regurgitation/leakiness. I see Dr. Banerjee recently changed pills. No medication or treatment changes at this time. Continue everything as is.

## 2023-03-17 ENCOUNTER — DOCUMENTATION (OUTPATIENT)
Dept: CARDIAC REHAB | Facility: HOSPITAL | Age: 78
End: 2023-03-17
Payer: MEDICARE

## 2023-04-20 ENCOUNTER — OFFICE VISIT (OUTPATIENT)
Dept: FAMILY MEDICINE CLINIC | Facility: CLINIC | Age: 78
End: 2023-04-20
Payer: MEDICARE

## 2023-04-20 ENCOUNTER — LAB (OUTPATIENT)
Dept: LAB | Facility: HOSPITAL | Age: 78
End: 2023-04-20
Payer: MEDICARE

## 2023-04-20 VITALS
HEART RATE: 75 BPM | OXYGEN SATURATION: 96 % | BODY MASS INDEX: 24.64 KG/M2 | HEIGHT: 65 IN | DIASTOLIC BLOOD PRESSURE: 78 MMHG | SYSTOLIC BLOOD PRESSURE: 126 MMHG | WEIGHT: 147.9 LBS

## 2023-04-20 DIAGNOSIS — I25.118 CORONARY ARTERY DISEASE OF NATIVE ARTERY OF NATIVE HEART WITH STABLE ANGINA PECTORIS: Chronic | ICD-10-CM

## 2023-04-20 DIAGNOSIS — F41.1 GAD (GENERALIZED ANXIETY DISORDER): Chronic | ICD-10-CM

## 2023-04-20 DIAGNOSIS — I10 ESSENTIAL HYPERTENSION: Chronic | ICD-10-CM

## 2023-04-20 DIAGNOSIS — Z13.220 SCREENING CHOLESTEROL LEVEL: ICD-10-CM

## 2023-04-20 DIAGNOSIS — Z00.00 MEDICARE ANNUAL WELLNESS VISIT, SUBSEQUENT: ICD-10-CM

## 2023-04-20 DIAGNOSIS — R42 LIGHTHEADED: ICD-10-CM

## 2023-04-20 DIAGNOSIS — I10 PRIMARY HYPERTENSION: Chronic | ICD-10-CM

## 2023-04-20 DIAGNOSIS — R07.2 PRECORDIAL PAIN: Primary | ICD-10-CM

## 2023-04-20 DIAGNOSIS — R42 CHRONIC VERTIGO: ICD-10-CM

## 2023-04-20 DIAGNOSIS — K58.9 IRRITABLE BOWEL SYNDROME WITHOUT DIARRHEA: Chronic | ICD-10-CM

## 2023-04-20 PROCEDURE — 80053 COMPREHEN METABOLIC PANEL: CPT

## 2023-04-20 PROCEDURE — 36415 COLL VENOUS BLD VENIPUNCTURE: CPT

## 2023-04-20 PROCEDURE — 85027 COMPLETE CBC AUTOMATED: CPT

## 2023-04-20 PROCEDURE — 80061 LIPID PANEL: CPT

## 2023-04-20 PROCEDURE — 83735 ASSAY OF MAGNESIUM: CPT

## 2023-04-20 RX ORDER — NITROGLYCERIN 0.4 MG/1
0.4 TABLET SUBLINGUAL
Qty: 20 TABLET | Refills: 12 | Status: SHIPPED | OUTPATIENT
Start: 2023-04-20

## 2023-04-20 RX ORDER — ASPIRIN 325 MG
325 TABLET ORAL DAILY
Qty: 90 TABLET | Refills: 3 | Status: SHIPPED | OUTPATIENT
Start: 2023-04-20

## 2023-04-20 RX ORDER — LANOLIN ALCOHOL/MO/W.PET/CERES
1000 CREAM (GRAM) TOPICAL DAILY
COMMUNITY

## 2023-04-20 NOTE — PROGRESS NOTES
Subjective   Olivia Araujo is a 77 y.o. female.  Reevaluation hypertension chronic generalized anxiety disorder chronic dyspepsia chronic vertigo.  In the interim had coronary artery evaluation per cardiology found to have nonobstructive disease and felt chest pain best treated with antianginals.  Now is on Ranexa and Isordil.  Chest discomfort has slightly decreased.  Does need sublingual nitro.  Blood pressure holding on verapamil.  It is time for lab work.  If cholesterol is elevated today then we will add a low-dose statin drug.  Continue complaining of lightheadedness unfortuneately great deal of probably organic and organic overlay.  Last visit we had instructed the possibility of seeing ENT and she now agrees.  Is up-to-date on all other.    History of Present Illness   HPI    The following portions of the patient's history were reviewed and updated as appropriate: allergies, current medications, past family history, past medical history, past social history, past surgical history and problem list.    Review of Systems  Review of Systems   Constitutional: Negative for activity change, appetite change, fatigue and unexpected weight change.   HENT: Negative for trouble swallowing and voice change.    Eyes: Negative for redness and visual disturbance.   Respiratory: Negative for cough and wheezing.    Cardiovascular: Positive for chest pain (Multifactorial improved). Negative for palpitations.   Gastrointestinal: Negative for abdominal pain, constipation, diarrhea, nausea and vomiting.   Genitourinary: Negative for urgency.   Musculoskeletal: Positive for arthralgias. Negative for joint swelling.   Neurological: Negative for syncope and headaches.   Hematological: Negative for adenopathy.   Psychiatric/Behavioral: Negative for sleep disturbance. The patient is nervous/anxious.        Objective   Physical Exam  Physical Exam  Constitutional:       Appearance: She is well-developed.   HENT:      Head:  "Normocephalic.   Eyes:      Pupils: Pupils are equal, round, and reactive to light.   Neck:      Thyroid: No thyromegaly.   Cardiovascular:      Rate and Rhythm: Normal rate and regular rhythm.      Heart sounds: Normal heart sounds. No murmur heard.    No friction rub. No gallop.   Pulmonary:      Breath sounds: Normal breath sounds.   Abdominal:      General: There is no distension.      Palpations: Abdomen is soft. There is no mass.      Tenderness: There is no abdominal tenderness.   Musculoskeletal:         General: Normal range of motion.      Cervical back: Normal range of motion.      Comments: No peripheral edema 2+ pulses get up and go 3 to 5 seconds gait normal   Skin:     General: Skin is warm and dry.   Neurological:      Mental Status: She is alert and oriented to person, place, and time.      Deep Tendon Reflexes: Reflexes are normal and symmetric.   Psychiatric:         Mood and Affect: Mood is anxious.         Speech: Speech is rapid and pressured.         Behavior: Behavior is cooperative.           Visit Vitals  /78   Pulse 75   Ht 165.1 cm (65\")   Wt 67.1 kg (147 lb 14.4 oz)   SpO2 96%   BMI 24.61 kg/m²     Body mass index is 24.61 kg/m².  /78   Pulse 75   Ht 165.1 cm (65\")   Wt 67.1 kg (147 lb 14.4 oz)   SpO2 96%   BMI 24.61 kg/m²       Assessment/Plan   Diagnoses and all orders for this visit:    1. Precordial pain (Primary)    2. Primary hypertension    3. Coronary artery disease of native artery of native heart with stable angina pectoris  -     aspirin 325 MG tablet; Take 1 tablet by mouth Daily. 1 tablet every day with lunch Oral  Dispense: 90 tablet; Refill: 3    4. Medicare annual wellness visit, subsequent    5. HAL (generalized anxiety disorder)    6. Chronic vertigo  -     Ambulatory Referral to ENT (Otolaryngology)    7. Lightheaded    8. Essential hypertension  -     verapamil SR (CALAN-SR) 120 MG CR tablet; Take 1 tablet by mouth Every Night. For bp  Dispense: 90 " tablet; Refill: 3    Other orders  -     nitroglycerin (NITROSTAT) 0.4 MG SL tablet; Place 1 tablet under the tongue Every 5 (Five) Minutes As Needed for Chest Pain. Take no more than 3 doses in 15 minutes.  Dispense: 20 tablet; Refill: 12     on wt loss measures and programs  Counseled on disease process.  Try to reenforce cardiac findings.  Counseled on lifestyle measures.  Orders as above.  Lab work already ordered previous will be done today.  Adjust cholesterol treatment as needed recheck 6 months

## 2023-04-20 NOTE — PROGRESS NOTES
The ABCs of the Annual Wellness Visit  Subsequent Medicare Wellness Visit    Subjective      Olivia Araujo is a 77 y.o. female who presents for a Subsequent Medicare Wellness Visit.    The following portions of the patient's history were reviewed and   updated as appropriate: allergies, current medications, past family history, past medical history, past social history, past surgical history and problem list.    Compared to one year ago, the patient feels her physical   health is better.    Compared to one year ago, the patient feels her mental   health is the same.    Recent Hospitalizations:  She was not admitted to the hospital during the last year.       Current Medical Providers:  Patient Care Team:  Shaun Billingsley MD as PCP - General (Family Medicine)  Hannah Rubin MA as Medical Assistant  Janes Keene MA (Inactive) as Medical Assistant    Outpatient Medications Prior to Visit   Medication Sig Dispense Refill   • albuterol (ACCUNEB) 1.25 MG/3ML nebulizer solution Take 3 mL by nebulization Every 6 (Six) Hours As Needed for Wheezing. 60 each 12   • albuterol sulfate  (90 Base) MCG/ACT inhaler INHALE 2 PUFFS EVERY 4 (FOUR) HOURS AS NEEDED FOR WHEEZING. 18 g 5   • esomeprazole (nexIUM) 40 MG capsule Take 1 capsule by mouth Every Morning Before Breakfast.     • fluticasone (FLONASE) 50 MCG/ACT nasal spray 2 sprays into the nostril(s) as directed by provider Daily As Needed for Rhinitis.     • isosorbide mononitrate (IMDUR) 30 MG 24 hr tablet Take 1 tablet by mouth Every Morning. 90 tablet 3   • Multiple Vitamins-Minerals (GNP HEALTHY EYES SUPERVISION PO) Take  by mouth 2 (Two) Times a Day.     • ondansetron (ZOFRAN) 4 MG tablet Take 1 tablet by mouth Every 8 (Eight) Hours As Needed. for nausea     • Polyvinyl Alcohol-Povidone (REFRESH OP) Apply  to eye(s) as directed by provider As Needed.     • ranolazine (Ranexa) 500 MG 12 hr tablet Take 1 tablet by mouth 2 (Two) Times a Day. 60 tablet 3   •  "simethicone (MYLICON,GAS-X) 125 MG capsule capsule Take 1 tablet by mouth Every 6 (Six) Hours.     • sucralfate (CARAFATE) 1 g tablet Take 1 tablet by mouth 3 (Three) Times a Day.     • vitamin B-12 (CYANOCOBALAMIN) 1000 MCG tablet Take 1 tablet by mouth Daily.     • aspirin 325 MG tablet Take 1 tablet by mouth Daily. 1 tablet every day with lunch Oral     • verapamil SR (CALAN-SR) 120 MG CR tablet Take 1 tablet by mouth Every Night. For bp 90 tablet 3     No facility-administered medications prior to visit.       No opioid medication identified on active medication list. I have reviewed chart for other potential  high risk medication/s and harmful drug interactions in the elderly.          Aspirin is on active medication list. Aspirin use is indicated based on review of current medical condition/s. Pros and cons of this therapy have been discussed today. Benefits of this medication outweigh potential harm.  Patient has been encouraged to continue taking this medication.  .      Patient Active Problem List   Diagnosis   • Nuclear sclerosis   • Posterior subcapsular polar age-related cataract   • Nuclear cataract   • GERD (gastroesophageal reflux disease)   • Essential hypertension   • Allergic rhinitis   • HAL (generalized anxiety disorder)   • Dry eye   • Irritable bowel syndrome without diarrhea   • Primary hypertension   • Coronary artery disease of native artery of native heart with stable angina pectoris     Advance Care Planning   Advance Care Planning     Advance Directive is not on file.  ACP discussion was held with the patient during this visit. Patient does not have an advance directive, information provided.     Objective    Vitals:    04/20/23 1402   BP: 126/78   Pulse: 75   SpO2: 96%   Weight: 67.1 kg (147 lb 14.4 oz)   Height: 165.1 cm (65\")   PainSc: 0-No pain     Estimated body mass index is 24.61 kg/m² as calculated from the following:    Height as of this encounter: 165.1 cm (65\").    Weight as of " this encounter: 67.1 kg (147 lb 14.4 oz).    BMI is within normal parameters. No other follow-up for BMI required.      Does the patient have evidence of cognitive impairment?   No            HEALTH RISK ASSESSMENT    Smoking Status:  Social History     Tobacco Use   Smoking Status Never   Smokeless Tobacco Never     Alcohol Consumption:  Social History     Substance and Sexual Activity   Alcohol Use No     Fall Risk Screen:    DHARMESH Fall Risk Assessment was completed, and patient is at LOW risk for falls.Assessment completed on:2023    Depression Screenin/20/2023     2:00 PM   PHQ-2/PHQ-9 Depression Screening   Little Interest or Pleasure in Doing Things 0-->not at all   Feeling Down, Depressed or Hopeless 0-->not at all   PHQ-9: Brief Depression Severity Measure Score 0       Health Habits and Functional and Cognitive Screenin/20/2023     2:00 PM   Functional & Cognitive Status   Do you have difficulty preparing food and eating? No   Do you have difficulty bathing yourself, getting dressed or grooming yourself? No   Do you have difficulty using the toilet? No   Do you have difficulty moving around from place to place? No   Do you have trouble with steps or getting out of a bed or a chair? No   Current Diet Other   Dental Exam Up to date   Eye Exam Up to date   Exercise (times per week) Other   Current Exercises Include Other   Do you need help using the phone?  No   Are you deaf or do you have serious difficulty hearing?  No   Do you need help with transportation? No   Do you need help shopping? No   Do you need help preparing meals?  No   Do you need help with housework?  No   Do you need help with laundry? No   Do you need help taking your medications? No   Do you need help managing money? No   Do you ever drive or ride in a car without wearing a seat belt? No   Have you felt unusual stress, anger or loneliness in the last month? Yes   Who do you live with? Alone   If you need help, do  you have trouble finding someone available to you? No   Have you been bothered in the last four weeks by sexual problems? No   Do you have difficulty concentrating, remembering or making decisions? No       Age-appropriate Screening Schedule:  Refer to the list below for future screening recommendations based on patient's age, sex and/or medical conditions. Orders for these recommended tests are listed in the plan section. The patient has been provided with a written plan.    Health Maintenance   Topic Date Due   • COVID-19 Vaccine (4 - Booster for Pfizer series) 01/17/2022   • ANNUAL WELLNESS VISIT  04/14/2023   • INFLUENZA VACCINE  08/01/2023   • MAMMOGRAM  11/10/2024   • TDAP/TD VACCINES (2 - Td or Tdap) 10/31/2027   • COLORECTAL CANCER SCREENING  10/21/2030   • HEPATITIS C SCREENING  Completed   • Pneumococcal Vaccine 65+  Completed   • LIPID PANEL  Discontinued   • DXA SCAN  Discontinued   • ZOSTER VACCINE  Discontinued                  CMS Preventative Services Quick Reference  Risk Factors Identified During Encounter:    Immunizations Discussed/Encouraged: Influenza and COVID19  Inactivity/Sedentary: Patient was advised to exercise at least 150 minutes a week per CDC recommendations.    The above risks/problems have been discussed with the patient.  Pertinent information has been shared with the patient in the After Visit Summary.    Diagnoses and all orders for this visit:    1. Precordial pain (Primary)    2. Primary hypertension    3. Coronary artery disease of native artery of native heart with stable angina pectoris  -     aspirin 325 MG tablet; Take 1 tablet by mouth Daily. 1 tablet every day with lunch Oral  Dispense: 90 tablet; Refill: 3    4. Medicare annual wellness visit, subsequent    5. HAL (generalized anxiety disorder)    6. Chronic vertigo  -     Ambulatory Referral to ENT (Otolaryngology)    7. Lightheaded    8. Essential hypertension  -     verapamil SR (CALAN-SR) 120 MG CR tablet; Take 1  tablet by mouth Every Night. For bp  Dispense: 90 tablet; Refill: 3    Other orders  -     nitroglycerin (NITROSTAT) 0.4 MG SL tablet; Place 1 tablet under the tongue Every 5 (Five) Minutes As Needed for Chest Pain. Take no more than 3 doses in 15 minutes.  Dispense: 20 tablet; Refill: 12        Follow Up:   Next Medicare Wellness visit to be scheduled in 1 year.      An After Visit Summary and PPPS were made available to the patient.

## 2023-04-21 LAB
ALBUMIN SERPL-MCNC: 4.1 G/DL (ref 3.5–5.2)
ALBUMIN/GLOB SERPL: 1.5 G/DL
ALP SERPL-CCNC: 111 U/L (ref 39–117)
ALT SERPL W P-5'-P-CCNC: 9 U/L (ref 1–33)
ANION GAP SERPL CALCULATED.3IONS-SCNC: 7 MMOL/L (ref 5–15)
AST SERPL-CCNC: 18 U/L (ref 1–32)
BILIRUB SERPL-MCNC: 0.3 MG/DL (ref 0–1.2)
BUN SERPL-MCNC: 12 MG/DL (ref 8–23)
BUN/CREAT SERPL: 13.3 (ref 7–25)
CALCIUM SPEC-SCNC: 9 MG/DL (ref 8.6–10.5)
CHLORIDE SERPL-SCNC: 99 MMOL/L (ref 98–107)
CHOLEST SERPL-MCNC: 210 MG/DL (ref 0–200)
CO2 SERPL-SCNC: 28 MMOL/L (ref 22–29)
CREAT SERPL-MCNC: 0.9 MG/DL (ref 0.57–1)
DEPRECATED RDW RBC AUTO: 46.4 FL (ref 37–54)
EGFRCR SERPLBLD CKD-EPI 2021: 65.6 ML/MIN/1.73
ERYTHROCYTE [DISTWIDTH] IN BLOOD BY AUTOMATED COUNT: 13.1 % (ref 12.3–15.4)
GLOBULIN UR ELPH-MCNC: 2.7 GM/DL
GLUCOSE SERPL-MCNC: 100 MG/DL (ref 65–99)
HCT VFR BLD AUTO: 37.6 % (ref 34–46.6)
HDLC SERPL-MCNC: 84 MG/DL (ref 40–60)
HGB BLD-MCNC: 12.9 G/DL (ref 12–15.9)
LDLC SERPL CALC-MCNC: 108 MG/DL (ref 0–100)
LDLC/HDLC SERPL: 1.25 {RATIO}
MAGNESIUM SERPL-MCNC: 2.6 MG/DL (ref 1.6–2.4)
MCH RBC QN AUTO: 32.7 PG (ref 26.6–33)
MCHC RBC AUTO-ENTMCNC: 34.3 G/DL (ref 31.5–35.7)
MCV RBC AUTO: 95.4 FL (ref 79–97)
PLATELET # BLD AUTO: 277 10*3/MM3 (ref 140–450)
PMV BLD AUTO: 9.9 FL (ref 6–12)
POTASSIUM SERPL-SCNC: 4.6 MMOL/L (ref 3.5–5.2)
PROT SERPL-MCNC: 6.8 G/DL (ref 6–8.5)
RBC # BLD AUTO: 3.94 10*6/MM3 (ref 3.77–5.28)
SODIUM SERPL-SCNC: 134 MMOL/L (ref 136–145)
TRIGL SERPL-MCNC: 105 MG/DL (ref 0–150)
VLDLC SERPL-MCNC: 18 MG/DL (ref 5–40)
WBC NRBC COR # BLD: 6.02 10*3/MM3 (ref 3.4–10.8)

## 2023-05-15 ENCOUNTER — OFFICE VISIT (OUTPATIENT)
Dept: OTOLARYNGOLOGY | Facility: CLINIC | Age: 78
End: 2023-05-15
Payer: MEDICARE

## 2023-05-15 ENCOUNTER — CLINICAL SUPPORT (OUTPATIENT)
Dept: AUDIOLOGY | Facility: CLINIC | Age: 78
End: 2023-05-15
Payer: MEDICARE

## 2023-05-15 VITALS — BODY MASS INDEX: 24.49 KG/M2 | WEIGHT: 147 LBS | HEIGHT: 65 IN | HEART RATE: 92 BPM | OXYGEN SATURATION: 98 %

## 2023-05-15 DIAGNOSIS — H90.3 SENSORINEURAL HEARING LOSS (SNHL) OF BOTH EARS: ICD-10-CM

## 2023-05-15 DIAGNOSIS — R42 DIZZINESS: ICD-10-CM

## 2023-05-15 DIAGNOSIS — H69.82 DYSFUNCTION OF LEFT EUSTACHIAN TUBE: ICD-10-CM

## 2023-05-15 DIAGNOSIS — H61.23 BILATERAL IMPACTED CERUMEN: ICD-10-CM

## 2023-05-15 DIAGNOSIS — H90.3 SENSORINEURAL HEARING LOSS (SNHL) OF BOTH EARS: Primary | ICD-10-CM

## 2023-05-15 DIAGNOSIS — R27.0 ATAXIA: Primary | ICD-10-CM

## 2023-05-15 PROCEDURE — 92557 COMPREHENSIVE HEARING TEST: CPT | Performed by: AUDIOLOGIST

## 2023-05-15 PROCEDURE — 92567 TYMPANOMETRY: CPT | Performed by: AUDIOLOGIST

## 2023-05-15 PROCEDURE — 1159F MED LIST DOCD IN RCRD: CPT | Performed by: OTOLARYNGOLOGY

## 2023-05-15 PROCEDURE — 1160F RVW MEDS BY RX/DR IN RCRD: CPT | Performed by: OTOLARYNGOLOGY

## 2023-05-15 PROCEDURE — 99204 OFFICE O/P NEW MOD 45 MIN: CPT | Performed by: OTOLARYNGOLOGY

## 2023-05-15 NOTE — PROGRESS NOTES
Chief complaint: Vertigo    Assessment and Plan:  77-year-old female with bilateral cerumen impaction removed today, bilateral sensorineural hearing loss, and complaints of dizziness most characteristic with some type of ataxia without vertigo    -We did discuss extensively that I do not believe her ears are the cause of her unsteady and falling feeling, what she describes of stumbling to 1 side and the other when walking is most consistent with some type of ataxia which often has an intracranial cause.  We did discuss that I cannot rule in or rule out any intracranial causes as I do not have any imaging, and I would recommend that she follow-up with her primary care physician to further evaluate this and consider referral to neurology if primary care work-up does not find another cause  -We discussed that she does have moderately severe sensorineural hearing loss bilaterally, but she does not describe that this is impacting her life in a significant way, based on her speech discrimination score she would be an excellent candidate for bilateral hearing aid amplification, but if she is not interested in this it is not worth the expense at this time.  She would be medically a candidate for bilateral hearing aid amplification.  -Regarding her recurrent cerumen impaction, we discussed using baby oil 4 to 5 drops to each ear about once weekly to help thin out her cerumen and prevent it from building up, she can follow-up as needed for any repeated ear cleanings in my office    History of present illness:    Ms. Araujo is a 77-year-old female presenting today for evaluation of dizziness and cerumen impaction.  She notes that for several years but most noticeably over the last 2 years she has had a feeling like she is falling over and says that when she walks she cannot walk in a straight line she will stagger to 1 side or the other she does not have any feeling of the world or herself spinning, she does not have any  issues with ear infections recently, but does note a prior history of recurrent infections in childhood and as early as several years ago.  She has never had ear surgery including ear tubes.  She does note that at times with this walking problem she will feel lightheaded, the symptoms do not occur when she is laying down or sitting down but do seem to occur when she changes positions from sitting or laying to standing, but especially are prevalent when she is walking.  She denies any significant difficulty with hearing or understanding she denies any significant tinnitus but does note that she does have some mild high-frequency intermittent nonbothersome tinnitus that is nonpulsatile in both ears but seems to be louder in the left than the right.  She states over the last 2 years this difficulty with feeling like falling and staggering with walking has been constant, prior to the 2 years ago this was an intermittent problem.    Vital Signs   Vitals:    05/15/23 1323   Pulse: 92   SpO2: 98%     Physical Exam:  General: NAD, awake and alert  Head: normocephalic, atraumatic  Eyes: EOMI, sclerae white, conjunctivae pink  Ears: pinnae intact without masses or lesions, narrow collapsible canals bilaterally, 100% cerumen impaction removed bilaterally   Right: TM intact without injection or effusion   Left: TM intact without injection or effusion  Nose: external straight without deformity   Mucosa pink, not edematous. No polyps seen. No purulence.   Septum: midline   Turbinates: not hypertrophied  OC/OP: mucosa moist and pink, no masses or lesions, tongue is midline and mobile. Tonsils atrophic/absent. Uvula single and elevates symmetrically.  Maxillary denture without underlying sores, well fitting  Neck: supple, no masses or lesions. Thyroid without palpable masses.  Neuro: CN II-VII and IX - XII grossly intact, no focal deficits hearing loss as below    Procedure: Removal of impacted cerumen, bilateral  Indication:  impacted cerumen, bilateral  EBL: None  Anesthesia: None  Complications: None  Surgeon: Remedios Belcher MD    Description:  After informed consent was verbally obtained from the patient, they are placed in a reclined position with the head turned to the contralateral side.  Using a binocular microscope and a 4 mm ear speculum The right canal was cleared of wax using suction, wire loop and alligator forceps.  The same was performed on the contralateral side.  The patient tolerated the procedure well and without known complications.  This concluded the procedure.      Results Review:  Referring physician note from 4/20/2023 reviewed today and demonstrates at that time reevaluation for HTN, chronic generalized anxiety, dyspepsia, and vertigo not further characterized, noted to have retrosternal chest pain and was evaluated by cardiology and noted to have nonobstructive disease.  An ENT referral was placed.  At that time an ear exam was not performed.  Audiogram and tympanometry from 5/15/2023 reviewed today and demonstrates: Type a tympanogram on the right, type B tympanogram on the left with a 1.4 mill volume, I suspect this is an artifact of inability to create a seal.  SRT 25 DB on the right with 88% discrimination.  Tones demonstrate normal sloping to moderately severe sensorineural hearing loss.  SRT 20 DB on the left with 84% discrimination pure tones demonstrate a normal sloping to moderately severe sensorineural hearing loss.     Review of Systems:  Positive ROS items: Ear discharge, ear pain, choking, wheezing, chest pain, nausea, cold intolerance, back pain, dizziness, weakness, and easy bruising or bleeding.  Otherwise, a 14 system ROS is negative except as pertinent positives are mentioned above.    Histories:  Allergies   Allergen Reactions   • Bethanechol Unknown (See Comments)   • Hydrocodone-Guaifenesin Nausea Only   • Metoclopramide Unknown (See Comments)   • Other Nausea And Vomiting     CODICLEAR     • Potassium Guaiacolsulfonate Unknown (See Comments)   • Clarithromycin Nausea And Vomiting   • Erythromycin Rash   • Latex Rash   • Neomycin Rash   • Sulfa Antibiotics Rash       Prior to Admission medications    Medication Sig Start Date End Date Taking? Authorizing Provider   albuterol (ACCUNEB) 1.25 MG/3ML nebulizer solution Take 3 mL by nebulization Every 6 (Six) Hours As Needed for Wheezing. 4/21/21   Shaun Billingsley MD   albuterol sulfate  (90 Base) MCG/ACT inhaler INHALE 2 PUFFS EVERY 4 (FOUR) HOURS AS NEEDED FOR WHEEZING. 7/29/22   Shaun Billingsley MD   aspirin 325 MG tablet Take 1 tablet by mouth Daily. 1 tablet every day with lunch Oral 4/20/23   Shaun Billingsley MD   esomeprazole (nexIUM) 40 MG capsule Take 1 capsule by mouth Every Morning Before Breakfast.    ProviderKarina MD   fluticasone (FLONASE) 50 MCG/ACT nasal spray 2 sprays into the nostril(s) as directed by provider Daily As Needed for Rhinitis.    ProviderKarina MD   isosorbide mononitrate (IMDUR) 30 MG 24 hr tablet Take 1 tablet by mouth Every Morning. 3/7/23   Malcom Banerjee MD   Multiple Vitamins-Minerals (GNP HEALTHY EYES SUPERVISION PO) Take  by mouth 2 (Two) Times a Day.    ProviderKarina MD   nitroglycerin (NITROSTAT) 0.4 MG SL tablet Place 1 tablet under the tongue Every 5 (Five) Minutes As Needed for Chest Pain. Take no more than 3 doses in 15 minutes. 4/20/23   Shaun Billingsley MD   ondansetron (ZOFRAN) 4 MG tablet Take 1 tablet by mouth Every 8 (Eight) Hours As Needed. for nausea 12/3/21   Emergency, Nurse GLADYS King   Polyvinyl Alcohol-Povidone (REFRESH OP) Apply  to eye(s) as directed by provider As Needed.    Karina Kong MD   ranolazine (Ranexa) 500 MG 12 hr tablet Take 1 tablet by mouth 2 (Two) Times a Day. 3/7/23   Malcom Banerjee MD   simethicone (MYLICON,GAS-X) 125 MG capsule capsule Take 1 tablet by mouth Every 6 (Six) Hours. 12/3/21   Emergency, Nurse GLADYS King   sucralfate (CARAFATE) 1 g tablet  Take 1 tablet by mouth 3 (Three) Times a Day.    Provider, MD Karina   verapamil SR (CALAN-SR) 120 MG CR tablet Take 1 tablet by mouth Every Night. For bp 4/20/23   Shaun Billingsley MD   vitamin B-12 (CYANOCOBALAMIN) 1000 MCG tablet Take 1 tablet by mouth Daily.    Provider, MD Karina       Past Medical History:   Diagnosis Date   • Acute suppurative otitis media without spontaneous rupture of ear drum     LEFT   • Allergic rhinitis    • Anxiety    • Arthritis    • Asthma    • Astigmatism    • Bladder disorder     leaks   • Cataract    • Colitis    • Cortical senile cataract    • Dry eye    • Dysphagia     POST-OPERATIVE   • Encounter for breast cancer screening other than mammogram    • Essential hypertension     no bp meds now   • Fatigue    • Filamentary keratitis of left eye    • GERD (gastroesophageal reflux disease)    • Hiatal hernia    • High blood pressure    • History of mammography, diagnostic 10/02/2009    MAMMOGRAM BREAST DIAGNOSTIC [place] 57683 (Probably benign findings-Recommend continued follow-up at the time of the patient's regular screening mammogram)   • History of mammography, diagnostic 03/31/2009    MAMMOGRAM BREAST DIAGNOSTIC LT 05455 (Probably benign punctate calcifications identified .Short interval follow-up suggested.Follow-up in 6 mos)   • Hyperlipidemia    • Hyponatremia    • Irritable bowel syndrome    • Keratoconjunctivitis sicca    • Labyrinthitis     CHRONIC   • Myopia    • Posterior subcapsular polar senile cataract    • Skin cancer     arm   • Stricture of esophagus    • Vitamin D deficiency    • Wears dentures     wears upper all the time   • Wears glasses     reading only       Past Surgical History:   Procedure Laterality Date   • CARDIAC CATHETERIZATION N/A 3/7/2023    Procedure: Left Heart Cath;  Surgeon: Malcom Banerjee MD;  Location: [facility] INVASIVE LOCATION;  Service: Cardiology;  Laterality: N/A;   • CATARACT EXTRACTION W/ INTRAOCULAR LENS IMPLANT Right 02/14/2020     Procedure: REMOVE CATARACT AND IMPLANT INTRAOCULAR LENS;  Surgeon: Sylvain Brunner MD;  Location: [facility] OR;  Service: Ophthalmology;  Laterality: Right;   • CATARACT EXTRACTION W/ INTRAOCULAR LENS IMPLANT Left 10/02/2020    Procedure: REMOVE CATARACT AND IMPLANT INTRAOCULAR LENS;  Surgeon: Sylvain Brunner MD;  Location: [facility] OR;  Service: Ophthalmology;  Laterality: Left;   • CERVICAL CONIZATION  04/27/1966    chronic cervictitis,unresponding to cautery and conservative treatment   • COLONOSCOPY  02/16/1998    Colon endoscopy (Internal hemorrhoids.Ischemic colitis has now healed.No other colonic polyps are identified)   • COLONOSCOPY N/A 10/21/2020    Procedure: COLONOSCOPY;  Surgeon: Ollie Laguna DO;  Location: [facility] ENDOSCOPY;  Service: Gastroenterology;  Laterality: N/A;   • DILATATION AND CURETTAGE  08/28/1978    D&C (punch biopsy of the cervix and electrocautery of the cervix)   • ENDOSCOPY N/A 05/09/2018    Procedure: ESOPHAGOGASTRODUODENOSCOPY;  Surgeon: Ollie Laguna DO;  Location: [facility] ENDOSCOPY;  Service: Gastroenterology   • ENDOSCOPY N/A 12/15/2022    Procedure: ESOPHAGOGASTRODUODENOSCOPY 11:00;  Surgeon: Ollie Laguna DO;  Location: [facility] ENDOSCOPY;  Service: Gastroenterology;  Laterality: N/A;   • MAMMO BILATERAL  07/01/2016    SCREENING MAMMOGRAPHY DIGITAL  (Medicare) (Encounter for screening mammogram for malignant neoplasm of breast)    • OTHER SURGICAL HISTORY  02/28/2008    Hysteroscope procedure (Diagnostic hysteroscopy with fractional dilation and curettage and polypectomy.Postmenopausal bleeding)   • OTHER SURGICAL HISTORY  12/30/1997    Hysteroscope procedure (Hysteroscopic polypectomy, fractional D&C.Fluid in endometrium and polypoid tissue per ultrasound)   • PAP SMEAR  03/24/2009    NORMAL   • THORACOTOMY  05/14/2008    Left,repair of diaphragmatic hernia,Jerri gastroplasty,placement of Marcaine infusion system(remainder of the procedure is  detailed in 's operative note) large Hiatal hernia GERD,shortened esophagus ( greater than 5cm)   • TOOTH EXTRACTION  04/27/1966    Dental procedure (Removes teeth.Carious and unerupted teeth)   • TUBAL ABDOMINAL LIGATION  05/27/1977    anxiety,marked depression,reaction to birth control       Social History     Socioeconomic History   • Marital status:    Tobacco Use   • Smoking status: Never   • Smokeless tobacco: Never   Vaping Use   • Vaping Use: Never used   Substance and Sexual Activity   • Alcohol use: No   • Drug use: No   • Sexual activity: Defer       Family History   Problem Relation Age of Onset   • Diabetes Other    • Hypertension Other    • Heart attack Other         REMARKABLE FOR MI   • Breast cancer Mother    • Cancer Mother    • Diabetes Mother    • Thyroid disease Mother    • Breast cancer Sister    • Cancer Sister    • Diabetes Sister    • Cancer Father    • Heart disease Father        Immunization status not specifically asked and therefore not specifically documented.    Voice dictation disclaimer:  Voice dictation was used in the creation of this note.  As such, there may be typos or inappropriate words throughout the document.  The document is proofread for typos and errors, however some may not be caught.      This document has been electronically signed by Remedios Belcher MD on May 15, 2023 12:49 CDT

## 2023-05-17 NOTE — PROGRESS NOTES
STANDARD AUDIOMETRIC EVALUATION      Name:  Olivia Araujo  :  1945  Age:  77 y.o.  Date of Evaluation:  2023      HISTORY    Reason for visit:  Olivia Araujo is seen today for a hearing test at the request of Dr. Remedios Belcher.  Patient reports she has been having dizziness.  She states she has had ear trouble since childhood, but she doesn't thinks she has problems with hearing.      EVALUATION    See Audiogram    RESULTS        Otoscopy and Tympanometry 226 Hz :  Right Ear:  Otoscopy:  Cerumen impaction, Testing completed after ears were cleaned          Tympanometry:  Middle ear function within normal limits    Left Ear:   Otoscopy:  Cerumen impaction, Testing completed after ears were cleaned        Tympanometry:  Reduced pressure and compliance     Test technique:  Standard Audiometry     Pure Tone Audiometry:   Patient responded to pure tones at 20-70 dB for 250-8000 Hz in right ear, and at 15-65 dB for 250-8000 Hz in left ear.       Speech Audiometry:        Right Ear:  Speech Reception Threshold (SRT) was obtained at 25 dBHL                 Speech Discrimination scores were 88% in quiet when words were presented at 65 dBHL       Left Ear:  Speech Reception Threshold (SRT) was obtained at 20 dBHL                 Speech Discrimination scores were 84% in quiet when words were presented at 60 dBHL    Reliability:   good    IMPRESSIONS:  1.  Tympanometry results are consistent with Middle ear function within normal limits in right ear, and Reduced pressure and compliance  in left ear.  2.  Pure tone results are consistent with within normal limits to moderately severe sloping sensorineural hearing loss for both ears.       RECOMMENDATIONS:  Patient is seeing the Ear Nose and Throat physician immediately following this examination.  It was a pleasure seeing Olivia Araujo in Audiology today.  We would be happy to do further testing or discuss these test as necessary.          This document has  been electronically signed by Luba Keene MS CCC-A on May 17, 2023 11:35 CDT       Luba Keene MS CCC-A  Licensed Audiologist

## 2023-06-05 RX ORDER — RANOLAZINE 500 MG/1
TABLET, EXTENDED RELEASE ORAL
Qty: 60 TABLET | Refills: 3 | Status: SHIPPED | OUTPATIENT
Start: 2023-06-05

## 2023-09-20 ENCOUNTER — OFFICE VISIT (OUTPATIENT)
Dept: CARDIOLOGY | Facility: CLINIC | Age: 78
End: 2023-09-20
Payer: MEDICARE

## 2023-09-20 VITALS
SYSTOLIC BLOOD PRESSURE: 112 MMHG | OXYGEN SATURATION: 95 % | HEIGHT: 65 IN | WEIGHT: 145.4 LBS | BODY MASS INDEX: 24.22 KG/M2 | DIASTOLIC BLOOD PRESSURE: 70 MMHG | HEART RATE: 80 BPM

## 2023-09-20 DIAGNOSIS — I25.118 CORONARY ARTERY DISEASE OF NATIVE ARTERY OF NATIVE HEART WITH STABLE ANGINA PECTORIS: Primary | Chronic | ICD-10-CM

## 2023-09-20 DIAGNOSIS — I10 ESSENTIAL HYPERTENSION: Chronic | ICD-10-CM

## 2023-09-20 PROCEDURE — 3078F DIAST BP <80 MM HG: CPT | Performed by: INTERNAL MEDICINE

## 2023-09-20 PROCEDURE — 3074F SYST BP LT 130 MM HG: CPT | Performed by: INTERNAL MEDICINE

## 2023-09-20 PROCEDURE — 99213 OFFICE O/P EST LOW 20 MIN: CPT | Performed by: INTERNAL MEDICINE

## 2023-09-20 RX ORDER — ROSUVASTATIN CALCIUM 10 MG/1
10 TABLET, COATED ORAL NIGHTLY
Qty: 30 TABLET | Refills: 5 | Status: SHIPPED | OUTPATIENT
Start: 2023-09-20

## 2023-09-20 NOTE — PROGRESS NOTES
Olivia Araujo  78 y.o. female    9/20/2023   1. Coronary artery disease of native artery of native heart with stable angina pectoris    2. Essential hypertension        History of Present Illness:      78 years old patient presented for routine follow-up.  Patient was risk stratified with echocardiogram preserved left ventricle systolic function CT coronary angiogram reported abnormal subsequent cardiac catheterization performed by Dr. Banerjee in March 2023.  Has severe diffuse disease of small caliber diagonal vessel not amenable to PCI due to his size and diameter.  Medical management is recommended.  She has moderate diffuse disease in right PDA and mid LAD.  She had preserved left ventricle systolic function.  She is on appropriate medical management we will continue aspirin continue Imdur continue Ranexa and calcium channel blocker.  We will add low-dose statin  77 years old patient today at [facility] problem of gastroesophageal reflux disease, history of hiatal hernia s/p surgery had previous stress test several years ago who referred for evaluation of chest discomfort predominantly epigastric area and some element of tenderness.  This is going on for more than 6-month or so.  EKG nonspecific ST-T wave changes in lateral lead and normal sinus rhythm.  The pain described as discomfort or pressure-like feeling and there is no aggravating or relieving factors but got worse after eating.  She is being managed with Carafate and omeprazole.  She is on aspirin.  She is a pain-free at the time of evaluation except upon palpation in epigastric area    Cardiac cath March 2023    Conclusion:  1.  Severe diffuse obstructive disease in a small caliber diagonal noted which is not amiable to PCI due to small vessel size.  2.  Moderate diffuse disease in RPDA and mid LAD   3.  Normal LV systolic function  4.  Mildly elevated left-sided filling pressures.    Echo March 2023    Left ventricular systolic function is  normal. Left ventricular ejection fraction appears to be 61 - 65%.    Left ventricular diastolic function is consistent with (grade II w/high LAP) pseudonormalization.    Estimated right ventricular systolic pressure from tricuspid regurgitation is normal (<35 mmHg).    CTA February 2023  CT FUNCTIONAL ANALYSIS:  Ejection Fraction    50 %  Diastolic Volume    82 ml  Systolic Volume      41 ml  Stroke Volume        41 ml  Cardiac Output       2.4 L/minute     CTA OF THE CORONARY ARTERIES:   There is adequate visualization of the left main, LAD,  circumflex, and RCA.   There is a type C LAD.   Coronary anatomy is right dominant     Left Main: Patent. Small calcification near the origin resulting  in less than 20% luminal narrowing. Small calcification in the  distal aspect of the bifurcation resulting in 30% or less luminal  narrowing.  LAD: Patent. Contains a calcified and noncalcified plaque in the  proximal aspect resulting in 50-79% stenosis. Just distal to this  calcification in the proximal LAD, there is a focus of more  moderate (50-69% stenosis due to a noncalcified plaque. Moderate  sized calcified plaques in the mid aspect resulting in  approximately 50% stenosis. Moderate sized noncalcified plaque in  the mid aspect resulting in approximately 50% stenosis.  Circumflex: Patent. Contains a moderate amount of calcified  plaque resulting in 50% or less stenosis.  RCA: Patent. Contains multiple small calcifications without  significant stenosis visualized.  PDA: Contains several relatively large calcifications for the  small size of the vessel which could be resulting in severe  stenosis but not adequately seen due to small vessel size.      ADDITIONAL FINDINGS:  The aortic valve is tricuspid.   There is no myocardial bridging.   On short axis views, the myocardium is homogeneous in thickness.  A 3 mm left upper lobe nodule seen on axial image 4.  Postoperative changes in the gastroesophageal  junction.  SUBJECTIVE:    Allergies   Allergen Reactions    Bethanechol Unknown (See Comments)    Hydrocodone-Guaifenesin Nausea Only    Metoclopramide Unknown (See Comments)    Other Nausea And Vomiting     CODICLEAR DH    Potassium Guaiacolsulfonate Unknown (See Comments)    Clarithromycin Nausea And Vomiting    Erythromycin Rash    Latex Rash    Neomycin Rash    Sulfa Antibiotics Rash       Past Medical History:   Diagnosis Date    Acute suppurative otitis media without spontaneous rupture of ear drum     LEFT    Allergic rhinitis     Anxiety     Arthritis     Asthma     Astigmatism     Bladder disorder     leaks    Cataract     Colitis     Cortical senile cataract     Dry eye     Dysphagia     POST-OPERATIVE    Encounter for breast cancer screening other than mammogram     Essential hypertension     no bp meds now    Fatigue     Filamentary keratitis of left eye     GERD (gastroesophageal reflux disease)     Hiatal hernia     High blood pressure     History of mammography, diagnostic 10/02/2009    [place] [address] (Probably benign findings-Recommend continued follow-up at the time of the patient's regular screening mammogram)    History of mammography, diagnostic 03/31/2009    MAMMOGRAM BREAST DIAGNOSTIC LT 54322 (Probably benign punctate calcifications identified .Short interval follow-up suggested.Follow-up in 6 mos)    Hyperlipidemia     Hyponatremia     Irritable bowel syndrome     Keratoconjunctivitis sicca     Labyrinthitis     CHRONIC    Myopia     Posterior subcapsular polar senile cataract     Skin cancer     arm    Stricture of esophagus     Vitamin D deficiency     Wears dentures     wears upper all the time    Wears glasses     reading only       Past Surgical History:   Procedure Laterality Date    CARDIAC CATHETERIZATION N/A 3/7/2023    Procedure: Left Heart Cath;  Surgeon: Malcom Banerjee MD;  Location: [facility] CATH INVASIVE LOCATION;  Service: Cardiology;  Laterality: N/A;     CATARACT EXTRACTION W/ INTRAOCULAR LENS IMPLANT Right 02/14/2020    Procedure: REMOVE CATARACT AND IMPLANT INTRAOCULAR LENS;  Surgeon: Sylvain Brunner MD;  Location: [facility] OR;  Service: Ophthalmology;  Laterality: Right;    CATARACT EXTRACTION W/ INTRAOCULAR LENS IMPLANT Left 10/02/2020    Procedure: REMOVE CATARACT AND IMPLANT INTRAOCULAR LENS;  Surgeon: Sylvain Brunner MD;  Location: [facility] OR;  Service: Ophthalmology;  Laterality: Left;    CERVICAL CONIZATION  04/27/1966    chronic cervictitis,unresponding to cautery and conservative treatment    COLONOSCOPY  02/16/1998    Colon endoscopy (Internal hemorrhoids.Ischemic colitis has now healed.No other colonic polyps are identified)    COLONOSCOPY N/A 10/21/2020    Procedure: COLONOSCOPY;  Surgeon: Ollie Laguna DO;  Location: [facility] ENDOSCOPY;  Service: Gastroenterology;  Laterality: N/A;    DILATATION AND CURETTAGE  08/28/1978    D&C (punch biopsy of the cervix and electrocautery of the cervix)    ENDOSCOPY N/A 05/09/2018    Procedure: ESOPHAGOGASTRODUODENOSCOPY;  Surgeon: Ollie Laguna DO;  Location: [facility] ENDOSCOPY;  Service: Gastroenterology    ENDOSCOPY N/A 12/15/2022    Procedure: ESOPHAGOGASTRODUODENOSCOPY 11:00;  Surgeon: Ollie Laguna DO;  Location: [facility] ENDOSCOPY;  Service: Gastroenterology;  Laterality: N/A;    MAMMO BILATERAL  07/01/2016    SCREENING MAMMOGRAPHY DIGITAL  (Medicare) (Encounter for screening mammogram for malignant neoplasm of breast)     OTHER SURGICAL HISTORY  02/28/2008    Hysteroscope procedure (Diagnostic hysteroscopy with fractional dilation and curettage and polypectomy.Postmenopausal bleeding)    OTHER SURGICAL HISTORY  12/30/1997    Hysteroscope procedure (Hysteroscopic polypectomy, fractional D&C.Fluid in endometrium and polypoid tissue per ultrasound)    PAP SMEAR  03/24/2009    NORMAL    THORACOTOMY  05/14/2008    Left,repair of diaphragmatic hernia,Jerri gastroplasty,placement of  Marcaine infusion system(remainder of the procedure is detailed in 's operative note) large Hiatal hernia GERD,shortened esophagus ( greater than 5cm)    TOOTH EXTRACTION  04/27/1966    Dental procedure (Removes teeth.Carious and unerupted teeth)    TUBAL ABDOMINAL LIGATION  05/27/1977    anxiety,marked depression,reaction to birth control       Family History   Problem Relation Age of Onset    Diabetes Other     Hypertension Other     Heart attack Other         REMARKABLE FOR MI    Breast cancer Mother     Cancer Mother     Diabetes Mother     Thyroid disease Mother     Breast cancer Sister     Cancer Sister     Diabetes Sister     Cancer Father     Heart disease Father        Social History     Socioeconomic History    Marital status:    Tobacco Use    Smoking status: Never    Smokeless tobacco: Never   Vaping Use    Vaping Use: Never used   Substance and Sexual Activity    Alcohol use: No    Drug use: No    Sexual activity: Defer       Current Outpatient Medications   Medication Sig Dispense Refill    albuterol (ACCUNEB) 1.25 MG/3ML nebulizer solution Take 3 mL by nebulization Every 6 (Six) Hours As Needed for Wheezing. 60 each 12    albuterol sulfate  (90 Base) MCG/ACT inhaler INHALE 2 PUFFS EVERY 4 (FOUR) HOURS AS NEEDED FOR WHEEZING. 18 g 5    aspirin 325 MG tablet Take 1 tablet by mouth Daily. 1 tablet every day with lunch Oral 90 tablet 3    esomeprazole (nexIUM) 40 MG capsule Take 1 capsule by mouth Every Morning Before Breakfast.      fluticasone (FLONASE) 50 MCG/ACT nasal spray 2 sprays into the nostril(s) as directed by provider Daily As Needed for Rhinitis.      isosorbide mononitrate (IMDUR) 30 MG 24 hr tablet Take 1 tablet by mouth Every Morning. 90 tablet 3    Multiple Vitamins-Minerals (GNP HEALTHY EYES SUPERVISION PO) Take  by mouth 2 (Two) Times a Day.      nitroglycerin (NITROSTAT) 0.4 MG SL tablet Place 1 tablet under the tongue Every 5 (Five) Minutes As Needed for Chest  "Pain. Take no more than 3 doses in 15 minutes. 20 tablet 12    ondansetron (ZOFRAN) 4 MG tablet Take 1 tablet by mouth Every 8 (Eight) Hours As Needed. for nausea      Polyvinyl Alcohol-Povidone (REFRESH OP) Apply  to eye(s) as directed by provider As Needed.      ranolazine (RANEXA) 500 MG 12 hr tablet TAKE 1 TABLET BY MOUTH 2 TIMES A DAY. 60 tablet 3    simethicone (MYLICON,GAS-X) 125 MG capsule capsule Take 1 tablet by mouth Every 6 (Six) Hours.      sucralfate (CARAFATE) 1 g tablet Take 1 tablet by mouth 3 (Three) Times a Day.      verapamil SR (CALAN-SR) 120 MG CR tablet Take 1 tablet by mouth Every Night. For bp 90 tablet 3    vitamin B-12 (CYANOCOBALAMIN) 1000 MCG tablet Take 1 tablet by mouth Daily.       No current facility-administered medications for this visit.       Review of Systems:   Constitutional:  no change in exercise tolerance.  HENT: Denies any hearing loss, epistaxis  Eyes: No blurred  Respiratory:  Denies dyspnea with exertion,no cough or wheezing  Cardiovascular: See H&P  Gastrointestinal:  Denies change in bowel habits, dyspepsia, ulcer disease  Endocrine: Negative for cold intolerance, heat intolerance, polydipsia, polyphagia  Genitourinary: Negative.  Musculoskeletal: Denies any history of arthritic symptoms or back problems.   Skin:  Denies rashes or skin lesions.   Allergic/Immunologic: Negative.  Negative for environmental allergies  Neurological:  Denies any history of recurrent headaches   Hematological: No history of easy bruisability  Psychiatric/Behavioral: Denies any history of depression    OBJECTIVE:    /70 (BP Location: Left arm, Patient Position: Sitting, Cuff Size: Adult)   Pulse 80   Ht 165.1 cm (65\")   Wt 66 kg (145 lb 6.4 oz)   SpO2 95%   BMI 24.20 kg/m²     Physical Exam:   Constitutional: Cooperative, alert and oriented, well-developed, well-nourished, in no acute distress.   Head: Normocephalic, conjunctive is pink, thyroid is nonpalpable, no jugular is " distention  Cardiovascular regular rate/rhythm, no S3, no pericardial rub  Pulmonary/Chest: Chest positive bilateral, good air entry, no rales, no wheezing  Abdominal: Abdomen soft, bowel sounds normoactive  Musculoskeletal: No deformities, positive peripheral pulses  Neurological: No gross motor or sensory deficits noted, affect appropriate.   Skin: Warm and dry to the touch, no apparent skin lesions or masses noted.   Psychiatric: Normal mood and affect. Behavior is normal    Procedures    Lab Results   Component Value Date    WBC 6.02 04/20/2023    HGB 12.9 04/20/2023    HCT 37.6 04/20/2023    MCV 95.4 04/20/2023     04/20/2023     Lab Results   Component Value Date    GLUCOSE 100 (H) 04/20/2023    BUN 12 04/20/2023    CREATININE 0.90 04/20/2023    EGFRIFNONA 53 (L) 04/08/2021    EGFRIFAFRI 65 10/01/2019    BCR 13.3 04/20/2023    CO2 28.0 04/20/2023    CALCIUM 9.0 04/20/2023    ALBUMIN 4.1 04/20/2023    AST 18 04/20/2023    ALT 9 04/20/2023     Lab Results   Component Value Date    CHOL 210 (H) 04/20/2023    CHOL 204 (H) 04/14/2022     Lab Results   Component Value Date    TRIG 105 04/20/2023    TRIG 100 04/14/2022    TRIG 88 06/28/2016    TRIG 89 06/28/2016     Lab Results   Component Value Date    HDL 84 (H) 04/20/2023    HDL 82 (H) 04/14/2022    HDL 63 06/28/2016     No components found for: LDLCALC  Lab Results   Component Value Date     (H) 04/20/2023     (H) 04/14/2022     06/01/2017     No results found for: HDLLDLRATIO  No components found for: CHOLHDL  Lab Results   Component Value Date    HGBA1C 5.6 01/04/2014     Lab Results   Component Value Date    TSH 2.66 06/28/2016           ASSESSMENT AND PLAN:  CAD s/p cardiac catheterization    Patient had abnormal CT coronary angiogram leading to cardiac catheterization moderate disease in LAD and RCA system severe single-vessel disease small caliber vessel no intervention was performed medical management was recommended.  She is on  her Ranexa aspirin and Imdur and she is pleased with clinical outcome.  No orthopnea PND chest pain lightheaded dizziness intergrade cardiac issues reported.    #2 hypertension     calcium channel blocker with good blood pressure   Patient have a 2 mm nodule in the lung discussed with the pulmonary department told no need to consult nobody and no need further investigation    Patient will continue follow-up with Dr. Banerjee in 6-month      I spent 17  minutes caring for Olivia on this date of service. This time includes time spent by me of counseling/coordination of care as relates to the presenting problem and any ordered procedures/tests as outlined above.           This document has been electronically signed by Vijaya Elena MD on September 20, 2023 11:39 CDT        Diagnoses and all orders for this visit:    1. Coronary artery disease of native artery of native heart with stable angina pectoris (Primary)    2. Essential hypertension          Vijaya Elena MD  9/20/2023  11:39 CDT

## 2025-03-24 NOTE — TELEPHONE ENCOUNTER
[facility] is needing an order for Ms. Araujo' Nebulizer faxed to [phone] so that insurance will cover the cost of the machine.    Yes

## (undated) DEVICE — GLV SURG SENSICARE MICRO PF LF 7.5 STRL

## (undated) DEVICE — CATH DIAG EXPO .045 PIG 5F 100CM

## (undated) DEVICE — ST CVR PROB PULLUP ULTRASND 5X48IN

## (undated) DEVICE — SOL IRR H2O BTL 1000ML STRL

## (undated) DEVICE — SINGLE-USE BIOPSY FORCEPS: Brand: RADIAL JAW 4

## (undated) DEVICE — GW PERIPH GUIDERIGHT STD/EXCHNG/J/TIP SS 0.035IN 5X260CM

## (undated) DEVICE — BITEBLOCK ENDO W/STRAP 60F A/ LF DISP

## (undated) DEVICE — TR BAND RADIAL ARTERY COMPRESSION DEVICE: Brand: TR BAND

## (undated) DEVICE — X-DRAPE ABS 12"X17" .25MM LEAD EQUIV STERILE X-RAY SHIELD 10/BOX: Brand: X-DRAPE

## (undated) DEVICE — Device

## (undated) DEVICE — FRCP BX RADJAW4 NDL 2.8 240CM LG OG BX40

## (undated) DEVICE — GLV SURG SENSICARE PI PF LF 7 GRN STRL

## (undated) DEVICE — ELECTRODE,RT,STRESS,FOAM,50PK: Brand: MEDLINE

## (undated) DEVICE — PK CATH LAB 60

## (undated) DEVICE — MODEL BT2000 P/N 700287-012KIT CONTENTS: MANIFOLD WITH SALINE AND CONTRAST PORTS, SALINE TUBING WITH SPIKE AND HAND SYRINGE, TRANSDUCER: Brand: BT2000 AUTOMATED MANIFOLD KIT

## (undated) DEVICE — GLIDESHEATH SLENDER STAINLESS STEEL KIT: Brand: GLIDESHEATH SLENDER

## (undated) DEVICE — CANN SMPL SOFTECH BIFLO ETCO2 A/M 7FT

## (undated) DEVICE — STERILE POLYISOPRENE POWDER-FREE SURGICAL GLOVES WITH EMOLLIENT COATING: Brand: PROTEXIS

## (undated) DEVICE — RADIFOCUS OPTITORQUE ANGIOGRAPHIC CATHETER: Brand: OPTITORQUE

## (undated) DEVICE — TRAP,MUCUS SPECIMEN,40CC: Brand: MEDLINE